# Patient Record
Sex: MALE | Race: BLACK OR AFRICAN AMERICAN | NOT HISPANIC OR LATINO | Employment: UNEMPLOYED | URBAN - METROPOLITAN AREA
[De-identification: names, ages, dates, MRNs, and addresses within clinical notes are randomized per-mention and may not be internally consistent; named-entity substitution may affect disease eponyms.]

---

## 2019-10-10 ENCOUNTER — HOSPITAL ENCOUNTER (EMERGENCY)
Facility: CLINIC | Age: 39
Discharge: HOME | End: 2019-10-10
Attending: EMERGENCY MEDICINE | Admitting: EMERGENCY MEDICINE

## 2019-10-10 VITALS
HEART RATE: 67 BPM | DIASTOLIC BLOOD PRESSURE: 76 MMHG | TEMPERATURE: 97.3 F | SYSTOLIC BLOOD PRESSURE: 128 MMHG | WEIGHT: 132.4 LBS | OXYGEN SATURATION: 100 %

## 2019-10-10 DIAGNOSIS — N34.2 URETHRITIS: ICD-10-CM

## 2019-10-10 DIAGNOSIS — Z11.3 SCREEN FOR STD (SEXUALLY TRANSMITTED DISEASE): ICD-10-CM

## 2019-10-10 LAB
ALBUMIN UR-MCNC: NEGATIVE MG/DL
APPEARANCE UR: ABNORMAL
BILIRUB UR QL STRIP: NEGATIVE
COLOR UR AUTO: ABNORMAL
GLUCOSE UR STRIP-MCNC: NEGATIVE MG/DL
HGB UR QL STRIP: ABNORMAL
KETONES UR STRIP-MCNC: NEGATIVE MG/DL
LEUKOCYTE ESTERASE UR QL STRIP: ABNORMAL
NITRATE UR QL: NEGATIVE
PH UR STRIP: 6 PH (ref 5–7)
RBC #/AREA URNS AUTO: 24 /HPF (ref 0–2)
SOURCE: ABNORMAL
SP GR UR STRIP: 1.01 (ref 1–1.03)
UROBILINOGEN UR STRIP-MCNC: NORMAL MG/DL (ref 0–2)
WBC #/AREA URNS AUTO: 66 /HPF (ref 0–5)

## 2019-10-10 PROCEDURE — 25000125 ZZHC RX 250

## 2019-10-10 PROCEDURE — 87086 URINE CULTURE/COLONY COUNT: CPT | Performed by: EMERGENCY MEDICINE

## 2019-10-10 PROCEDURE — 96372 THER/PROPH/DIAG INJ SC/IM: CPT

## 2019-10-10 PROCEDURE — 99284 EMERGENCY DEPT VISIT MOD MDM: CPT | Mod: Z6 | Performed by: EMERGENCY MEDICINE

## 2019-10-10 PROCEDURE — 81001 URINALYSIS AUTO W/SCOPE: CPT | Performed by: FAMILY MEDICINE

## 2019-10-10 PROCEDURE — 99284 EMERGENCY DEPT VISIT MOD MDM: CPT

## 2019-10-10 PROCEDURE — 25000132 ZZH RX MED GY IP 250 OP 250 PS 637: Performed by: EMERGENCY MEDICINE

## 2019-10-10 PROCEDURE — 25000128 H RX IP 250 OP 636: Performed by: EMERGENCY MEDICINE

## 2019-10-10 RX ORDER — LIDOCAINE HYDROCHLORIDE 10 MG/ML
INJECTION, SOLUTION EPIDURAL; INFILTRATION; INTRACAUDAL; PERINEURAL
Status: COMPLETED
Start: 2019-10-10 | End: 2019-10-10

## 2019-10-10 RX ORDER — CEFTRIAXONE SODIUM 250 MG
250 VIAL (EA) INJECTION ONCE
Status: COMPLETED | OUTPATIENT
Start: 2019-10-10 | End: 2019-10-10

## 2019-10-10 RX ORDER — AZITHROMYCIN 250 MG/1
1000 TABLET, FILM COATED ORAL ONCE
Status: COMPLETED | OUTPATIENT
Start: 2019-10-10 | End: 2019-10-10

## 2019-10-10 RX ADMIN — LIDOCAINE HYDROCHLORIDE 10 MG: 10 INJECTION, SOLUTION EPIDURAL; INFILTRATION; INTRACAUDAL; PERINEURAL at 14:48

## 2019-10-10 RX ADMIN — CEFTRIAXONE SODIUM 250 MG: 250 INJECTION, POWDER, FOR SOLUTION INTRAMUSCULAR; INTRAVENOUS at 14:43

## 2019-10-10 RX ADMIN — AZITHROMYCIN 1000 MG: 250 TABLET, FILM COATED ORAL at 14:42

## 2019-10-10 ASSESSMENT — ENCOUNTER SYMPTOMS
VOMITING: 0
ABDOMINAL PAIN: 0
DYSURIA: 1
FEVER: 0
HEMATURIA: 0
CHILLS: 0
NAUSEA: 0

## 2019-10-10 NOTE — DISCHARGE INSTRUCTIONS
Please make an appointment to follow up with Your Primary Care Provider, [facility] (phone: [phone], [facility] (phone: [phone]), [facility] (phone: [phone]), or [facility] in 4-6 weeks for repeat testing.    Return to the ED for worsening pain, fever, blood in your urine, abdominal pain.    Abstain from all sexual contact until cured. Inform your sexual partners that they should be tested.

## 2019-10-10 NOTE — ED PROVIDER NOTES
[facility] [place])    10/10/19       History     Chief Complaint   Patient presents with     Dysuria     penis burns every time it urinates; pain has been the last 3 days; pt had sex with new partner     The history is provided by the patient and medical records.     Juliano Vale is a 39 year old male who has a PMHx of prior gonococcal infection who presents to the Emergency Department for evaluation of burning dysuria.  Patient states that he has a burning sensation in his penis when urinating.  He states that the pain is been ongoing for the past 3 days.  States that he had unprotected intercourse with 2 female partners though he states they were not complete strangers but he was unsure of their past medical/sexual history.  He is concerned that he may have gonorrhea which he had in the past approximately 7 years ago.  He does not have a history of prior UTIs.    Here in ED, he denies fevers, back pain, hematuria, or testicular pain.  Patient did admit to some purulent penile discharge. Denies redness or irritation of the penis. Denies nausea, vomiting, diarrhea or abdominal pain.     Per review of patient's medical record, he was seen at [facility] on 9/17/2013 for dysuria.  Urine came back positive for gonorrhea and chlamydia. He was given a course of Cipro for GC and Zithromax for Chlamydia at that time.    I have reviewed the Medications, Allergies, Past Medical and Surgical History, and Social History in the Epic system.    History reviewed. No pertinent past medical history.    History reviewed. No pertinent surgical history.    History reviewed. No pertinent family history.    Social History     Tobacco Use     Smoking status: Current Some Day Smoker     Packs/day: 0.00     Smokeless tobacco: Never Used   Substance Use Topics     Alcohol use: Yes     Comment: occasional       No current facility-administered medications for this encounter.      No current outpatient  medications on file.      No Known Allergies     Review of Systems   Constitutional: Negative for chills and fever.   Gastrointestinal: Negative for abdominal pain, nausea and vomiting.   Genitourinary: Positive for discharge (white discharge) and dysuria (burning sensation). Negative for hematuria, penile pain, penile swelling, scrotal swelling and testicular pain.   All other systems reviewed and are negative.      Physical Exam   BP: 128/76  Pulse: 67  Temp: 97.3  F (36.3  C)  Weight: 60.1 kg (132 lb 6.4 oz)  SpO2: 100 %      Physical Exam  Vitals signs and nursing note reviewed. Exam conducted with a chaperone present (Male Scribe).   Constitutional:       General: He is not in acute distress.     Appearance: He is well-developed. He is ill-appearing. He is not toxic-appearing or diaphoretic.   HENT:      Head: Normocephalic and atraumatic.      Nose: Nose normal.      Mouth/Throat:      Mouth: Mucous membranes are moist.      Pharynx: Oropharynx is clear.   Eyes:      General: No scleral icterus.     Conjunctiva/sclera: Conjunctivae normal.   Neck:      Musculoskeletal: Normal range of motion and neck supple. No neck rigidity.   Cardiovascular:      Rate and Rhythm: Normal rate and regular rhythm.   Pulmonary:      Effort: Pulmonary effort is normal. No respiratory distress.      Breath sounds: Normal breath sounds. No stridor. No wheezing, rhonchi or rales.   Abdominal:      General: Bowel sounds are normal. There is no distension.      Tenderness: There is no tenderness. There is no right CVA tenderness, left CVA tenderness or rebound.      Hernia: No hernia is present. There is no hernia in the right inguinal area or left inguinal area.   Genitourinary:     Pubic Area: No rash.       Penis: Circumcised. Discharge present. No erythema, swelling or lesions.       Scrotum/Testes: Normal.         Right: Mass, tenderness or swelling not present.         Left: Mass, tenderness or swelling not present.       Comments: White purulent discharge from urethra. No surrounding skin changes or cellulitic abnormailities of glans penis. Hard, nontender inguinal limph nodes palpated bilaterally.   Musculoskeletal: Normal range of motion.         General: No deformity.   Lymphadenopathy:      Lower Body: Right inguinal adenopathy present. Left inguinal adenopathy present.   Skin:     General: Skin is warm and dry.      Findings: No rash.   Neurological:      Mental Status: He is alert and oriented to person, place, and time.   Psychiatric:         Behavior: Behavior normal.         Thought Content: Thought content normal.         ED Course   2:33 PM  The patient was seen and examined by Judith Reeves MD in Room ED03.       Procedures             Critical Care time:  none             Labs Ordered and Resulted from Time of ED Arrival Up to the Time of Departure from the ED   ROUTINE UA WITH MICROSCOPIC REFLEX TO CULTURE - Abnormal; Notable for the following components:       Result Value    Blood Urine Small (*)     Leukocyte Esterase Urine Large (*)     WBC Urine 66 (*)     RBC Urine 24 (*)     All other components within normal limits            Assessments & Plan (with Medical Decision Making)   This is a 39 year old male who has a PMHx of prior gonococcal infection who presents to the Emergency Department for evaluation of burning dysuria.      Ddx: STD, urethritis, UTI    I have reviewed the nursing notes.    I have reviewed the findings, diagnosis, plan and need for follow up with the patient. Treated empirically for STD (urethritis) with IM ceftriaxone and PO azithromycin. Discussed safe sex practices and abstinence until cure. Patient declined HIV or syphilis testing. Advised f/u with PCP for repeat testing in 3-4 weeks and encouraged him to inform partners of sxs so they can seek testing as well.    There are no discharge medications for this patient.      Final diagnoses:   Urethritis   Screen for STD (sexually transmitted  disease)     I, Jaskaran Alicia, am serving as a trained medical scribe to document services personally performed by Judith Reeves MD, based on the provider's statements to me.   I, Judith Reeves MD, was physically present and have reviewed and verified the accuracy of this note documented by Jaskaran Alicia.     10/10/2019   [facility], [place], EMERGENCY DEPARTMENT     Judith Reeves MD  10/14/19 4363

## 2019-10-10 NOTE — ED AVS SNAPSHOT
[facility], [place], Emergency Department  [address]  [facility] [address]  Phone:  [phone]  Fax:  [phone]                                    Juliano Vale   MRN: [id number]    Department:  [facility], Emergency Department   Date of Visit:  10/10/2019           After Visit Summary Signature Page    I have received my discharge instructions, and my questions have been answered. I have discussed any challenges I see with this plan with the nurse or doctor.    ..........................................................................................................................................  Patient/Patient Representative Signature      ..........................................................................................................................................  Patient Representative Print Name and Relationship to Patient    ..................................................               ................................................  Date                                   Time    ..........................................................................................................................................  Reviewed by Signature/Title    ...................................................              ..............................................  Date                                               Time          22EPIC Rev 08/18

## 2019-10-11 LAB
BACTERIA SPEC CULT: NO GROWTH
Lab: NORMAL
SPECIMEN SOURCE: NORMAL

## 2020-06-01 ENCOUNTER — APPOINTMENT (OUTPATIENT)
Dept: ULTRASOUND IMAGING | Facility: CLINIC | Age: 40
End: 2020-06-01
Attending: EMERGENCY MEDICINE
Payer: COMMERCIAL

## 2020-06-01 ENCOUNTER — HOSPITAL ENCOUNTER (EMERGENCY)
Facility: CLINIC | Age: 40
Discharge: HOME | End: 2020-06-01
Attending: EMERGENCY MEDICINE | Admitting: EMERGENCY MEDICINE
Payer: COMMERCIAL

## 2020-06-01 VITALS
RESPIRATION RATE: 16 BRPM | DIASTOLIC BLOOD PRESSURE: 98 MMHG | SYSTOLIC BLOOD PRESSURE: 141 MMHG | TEMPERATURE: 98 F | OXYGEN SATURATION: 100 % | HEART RATE: 52 BPM

## 2020-06-01 DIAGNOSIS — R10.13 ABDOMINAL PAIN, EPIGASTRIC: ICD-10-CM

## 2020-06-01 LAB
ALBUMIN SERPL-MCNC: 4.1 G/DL (ref 3.4–5)
ALP SERPL-CCNC: 67 U/L (ref 40–150)
ALT SERPL W P-5'-P-CCNC: 24 U/L (ref 0–70)
ANION GAP SERPL CALCULATED.3IONS-SCNC: 4 MMOL/L (ref 3–14)
AST SERPL W P-5'-P-CCNC: 19 U/L (ref 0–45)
BASOPHILS # BLD AUTO: 0 10E9/L (ref 0–0.2)
BASOPHILS NFR BLD AUTO: 0.4 %
BILIRUB SERPL-MCNC: 0.4 MG/DL (ref 0.2–1.3)
BUN SERPL-MCNC: 11 MG/DL (ref 7–30)
CALCIUM SERPL-MCNC: 9.8 MG/DL (ref 8.5–10.1)
CHLORIDE SERPL-SCNC: 105 MMOL/L (ref 94–109)
CO2 SERPL-SCNC: 28 MMOL/L (ref 20–32)
CREAT SERPL-MCNC: 0.97 MG/DL (ref 0.66–1.25)
DIFFERENTIAL METHOD BLD: NORMAL
EOSINOPHIL # BLD AUTO: 0 10E9/L (ref 0–0.7)
EOSINOPHIL NFR BLD AUTO: 0.6 %
ERYTHROCYTE [DISTWIDTH] IN BLOOD BY AUTOMATED COUNT: 13.1 % (ref 10–15)
ETHANOL SERPL-MCNC: <0.01 G/DL
GFR SERPL CREATININE-BSD FRML MDRD: >90 ML/MIN/{1.73_M2}
GLUCOSE SERPL-MCNC: 84 MG/DL (ref 70–99)
HCT VFR BLD AUTO: 46.3 % (ref 40–53)
HGB BLD-MCNC: 15.2 G/DL (ref 13.3–17.7)
IMM GRANULOCYTES # BLD: 0 10E9/L (ref 0–0.4)
IMM GRANULOCYTES NFR BLD: 0.2 %
LIPASE SERPL-CCNC: 60 U/L (ref 73–393)
LYMPHOCYTES # BLD AUTO: 2.2 10E9/L (ref 0.8–5.3)
LYMPHOCYTES NFR BLD AUTO: 40.2 %
MCH RBC QN AUTO: 30 PG (ref 26.5–33)
MCHC RBC AUTO-ENTMCNC: 32.8 G/DL (ref 31.5–36.5)
MCV RBC AUTO: 91 FL (ref 78–100)
MONOCYTES # BLD AUTO: 0.5 10E9/L (ref 0–1.3)
MONOCYTES NFR BLD AUTO: 9.2 %
NEUTROPHILS # BLD AUTO: 2.7 10E9/L (ref 1.6–8.3)
NEUTROPHILS NFR BLD AUTO: 49.4 %
NRBC # BLD AUTO: 0 10*3/UL
NRBC BLD AUTO-RTO: 0 /100
PLATELET # BLD AUTO: 193 10E9/L (ref 150–450)
POTASSIUM SERPL-SCNC: 4.6 MMOL/L (ref 3.4–5.3)
PROT SERPL-MCNC: 7.8 G/DL (ref 6.8–8.8)
RBC # BLD AUTO: 5.07 10E12/L (ref 4.4–5.9)
SODIUM SERPL-SCNC: 137 MMOL/L (ref 133–144)
WBC # BLD AUTO: 5.5 10E9/L (ref 4–11)

## 2020-06-01 PROCEDURE — 85025 COMPLETE CBC W/AUTO DIFF WBC: CPT | Performed by: EMERGENCY MEDICINE

## 2020-06-01 PROCEDURE — 99284 EMERGENCY DEPT VISIT MOD MDM: CPT | Mod: Z6 | Performed by: EMERGENCY MEDICINE

## 2020-06-01 PROCEDURE — 25000125 ZZHC RX 250: Performed by: EMERGENCY MEDICINE

## 2020-06-01 PROCEDURE — 83690 ASSAY OF LIPASE: CPT | Performed by: EMERGENCY MEDICINE

## 2020-06-01 PROCEDURE — 25000132 ZZH RX MED GY IP 250 OP 250 PS 637: Performed by: EMERGENCY MEDICINE

## 2020-06-01 PROCEDURE — 80053 COMPREHEN METABOLIC PANEL: CPT | Performed by: EMERGENCY MEDICINE

## 2020-06-01 PROCEDURE — 76705 ECHO EXAM OF ABDOMEN: CPT

## 2020-06-01 PROCEDURE — 80320 DRUG SCREEN QUANTALCOHOLS: CPT | Performed by: EMERGENCY MEDICINE

## 2020-06-01 PROCEDURE — 99284 EMERGENCY DEPT VISIT MOD MDM: CPT | Mod: 25 | Performed by: EMERGENCY MEDICINE

## 2020-06-01 PROCEDURE — 25000128 H RX IP 250 OP 636: Performed by: EMERGENCY MEDICINE

## 2020-06-01 RX ORDER — ONDANSETRON 4 MG/1
4 TABLET, FILM COATED ORAL EVERY 8 HOURS PRN
Qty: 15 TABLET | Refills: 0 | Status: SHIPPED | OUTPATIENT
Start: 2020-06-01 | End: 2021-08-21

## 2020-06-01 RX ORDER — ONDANSETRON 4 MG/1
4 TABLET, ORALLY DISINTEGRATING ORAL ONCE
Status: COMPLETED | OUTPATIENT
Start: 2020-06-01 | End: 2020-06-01

## 2020-06-01 RX ORDER — SUCRALFATE 1 G/1
1 TABLET ORAL 2 TIMES DAILY PRN
Qty: 15 TABLET | Refills: 0 | Status: SHIPPED | OUTPATIENT
Start: 2020-06-01 | End: 2021-08-21

## 2020-06-01 RX ORDER — PANTOPRAZOLE SODIUM 40 MG/1
40 TABLET, DELAYED RELEASE ORAL DAILY
Qty: 30 TABLET | Refills: 0 | Status: SHIPPED | OUTPATIENT
Start: 2020-06-01 | End: 2020-07-01

## 2020-06-01 RX ADMIN — LIDOCAINE HYDROCHLORIDE 30 ML: 20 SOLUTION ORAL; TOPICAL at 17:22

## 2020-06-01 RX ADMIN — ONDANSETRON 4 MG: 4 TABLET, ORALLY DISINTEGRATING ORAL at 17:21

## 2020-06-01 NOTE — DISCHARGE INSTRUCTIONS
TODAY'S VISIT:  You were seen today for abdominal pain   -   - If you had any labs or imaging/radiology tests performed today, you should also discuss these tests with your usual provider.     FOLLOW-UP:  Please make an appointment to follow up with:  - Your Primary Care Provider. If you do not have a PCP, please call the [facility] (phone: [phone] for an appointment  -   Please make an appointment to follow up with GI Clinic (phone: [phone]) if your symptoms do not resolve    - Have your provider review the results from today's visit with you again to make sure no further follow-up or additional testing is needed based on those results.     Medications  - protonix  - zofran  - carafate    Other instructions  - try to eat a soft bland diet, avoiding fatty foods, spicy foods, alcohol, or acidic foods such as citrus fruits or tomatoes    RETURN TO THE EMERGENCY DEPARTMENT  Return to the Emergency Department at any time for any new or worsening symptoms or any concerns.

## 2020-06-01 NOTE — ED PROVIDER NOTES
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  The patient states is located in the mid abdomen/epigastric region.  It has been present for several days.  It worsens with eating/drinking.  It started after he had an episode of heavy drinking in which he mixed the alcohol with cranberry juice.  He was attributing the pain to the cranberry juice, because he reports he drank a lot of it, but it has not been improving since he stopped his juice consumption.  The patient states that he typically drinks 1 to 2 glasses of alcohol 2-3 times per week.  He does not drink every day.  He states that he did not have atypical amount of alcohol for him.  He states that he did vomit the other day, nonbloody emesis, however, today he was able to tolerate food, although he did again experience nausea that worsened with eating or drinking.  The patient states that he does smoke cigarettes and uses marijuana several times a week.    I have reviewed the Medications, Allergies, Past Medical and Surgical History, and Social History in the Epic system.    No past medical history on file.  No past surgical history on file.  No current facility-administered medications for this encounter.      No current outpatient medications on file.     No Known Allergies  Past medical history, past surgical history, medications, and allergies were reviewed with the patient. Additional pertinent items: None    Social History     Socioeconomic History     Marital status: Single     Spouse name: Not on file     Number of children: Not on file     Years of education: Not on file     Highest education level: Not on file   Occupational History     Not on file   Social Needs     Financial resource strain: Not on file     Food  insecurity     Worry: Not on file     Inability: Not on file     Transportation needs     Medical: Not on file     Non-medical: Not on file   Tobacco Use     Smoking status: Current Some Day Smoker     Packs/day: 0.00     Smokeless tobacco: Never Used   Substance and Sexual Activity     Alcohol use: Yes     Comment: occasional     Drug use: Not on file     Sexual activity: Not on file   Lifestyle     Physical activity     Days per week: Not on file     Minutes per session: Not on file     Stress: Not on file   Relationships     Social connections     Talks on phone: Not on file     Gets together: Not on file     Attends Denominational service: Not on file     Active member of club or organization: Not on file     Attends meetings of clubs or organizations: Not on file     Relationship status: Not on file     Intimate partner violence     Fear of current or ex partner: Not on file     Emotionally abused: Not on file     Physically abused: Not on file     Forced sexual activity: Not on file   Other Topics Concern     Not on file   Social History Narrative     Not on file     Social history was reviewed with the patient. Additional pertinent items: None    Review of Systems  General: No fevers or chills  Skin: No rash or diaphoresis  Eyes: No eye redness or discharge  Ears/Nose/Throat: No rhinorrhea or nasal congestion  Respiratory: No cough or SOB  Cardiovascular: No chest pain or palpitations  Gastrointestinal: See HPI  Genitourinary: No urinary frequency, hematuria, or dysuria  Musculoskeletal: No arthralgias or myalgias  Neurologic: No numbness or weakness  Psychiatric: Positive for alcohol use  Hematologic/Lymphatic/Immunologic: No leg swelling, no easy bruising/bleeding  Endocrine: No polyuria/polydypsia    A complete review of systems was performed with pertinent positives and negatives noted in the HPI, and all other systems negative.    Physical Exam   BP: (!) 142/94  Pulse: 101  Temp: 98  F (36.7  C)  SpO2: 98  %      General: Well nourished, well developed, NAD  HEENT: EOMI, anicteric. NCAT, MMM  Neck: no jugular venous distension, supple, nl ROM  Cardiac: Regular rate and rhythm.  Intact peripheral pulses  Pulm: Airway patent, nonlabored breathing  Abd: Soft, mild tenderness to palpation in the epigastric region without rebound or guarding, no Hilario sign, nondistended.  No masses palpated.    Skin: Warm and dry to the touch.  No rash  Extremities: No LE edema, no cyanosis, w/w/p  Neuro: A&Ox3, no gross focal deficits    ED Course        Procedures                           Labs Ordered and Resulted from Time of ED Arrival Up to the Time of Departure from the ED   LIPASE - Abnormal; Notable for the following components:       Result Value    Lipase 60 (*)     All other components within normal limits   CBC WITH PLATELETS DIFFERENTIAL   COMPREHENSIVE METABOLIC PANEL   ALCOHOL ETHYL   UA MACROSCOPIC WITH REFLEX TO MICRO AND CULTURE            Results for orders placed or performed during the hospital encounter of 06/01/20 (from the past 24 hour(s))   CBC with platelets differential   Result Value Ref Range    WBC 5.5 4.0 - 11.0 10e9/L    RBC Count 5.07 4.4 - 5.9 10e12/L    Hemoglobin 15.2 13.3 - 17.7 g/dL    Hematocrit 46.3 40.0 - 53.0 %    MCV 91 78 - 100 fl    MCH 30.0 26.5 - 33.0 pg    MCHC 32.8 31.5 - 36.5 g/dL    RDW 13.1 10.0 - 15.0 %    Platelet Count 193 150 - 450 10e9/L    Diff Method Automated Method     % Neutrophils 49.4 %    % Lymphocytes 40.2 %    % Monocytes 9.2 %    % Eosinophils 0.6 %    % Basophils 0.4 %    % Immature Granulocytes 0.2 %    Nucleated RBCs 0 0 /100    Absolute Neutrophil 2.7 1.6 - 8.3 10e9/L    Absolute Lymphocytes 2.2 0.8 - 5.3 10e9/L    Absolute Monocytes 0.5 0.0 - 1.3 10e9/L    Absolute Eosinophils 0.0 0.0 - 0.7 10e9/L    Absolute Basophils 0.0 0.0 - 0.2 10e9/L    Abs Immature Granulocytes 0.0 0 - 0.4 10e9/L    Absolute Nucleated RBC 0.0    Comprehensive metabolic panel   Result Value  Ref Range    Sodium 137 133 - 144 mmol/L    Potassium 4.6 3.4 - 5.3 mmol/L    Chloride 105 94 - 109 mmol/L    Carbon Dioxide 28 20 - 32 mmol/L    Anion Gap 4 3 - 14 mmol/L    Glucose 84 70 - 99 mg/dL    Urea Nitrogen 11 7 - 30 mg/dL    Creatinine 0.97 0.66 - 1.25 mg/dL    GFR Estimate >90 >60 mL/min/[1.73_m2]    GFR Estimate If Black >90 >60 mL/min/[1.73_m2]    Calcium 9.8 8.5 - 10.1 mg/dL    Bilirubin Total 0.4 0.2 - 1.3 mg/dL    Albumin 4.1 3.4 - 5.0 g/dL    Protein Total 7.8 6.8 - 8.8 g/dL    Alkaline Phosphatase 67 40 - 150 U/L    ALT 24 0 - 70 U/L    AST 19 0 - 45 U/L   Lipase   Result Value Ref Range    Lipase 60 (L) 73 - 393 U/L   Alcohol ethyl   Result Value Ref Range    Ethanol g/dL <0.01 <0.01 g/dL   Abdomen US, limited (RUQ only)    Narrative    US ABDOMEN LIMITED 6/1/2020 5:51 PM    CLINICAL HISTORY: Epigastric and right upper quadrant pain. Nausea and  vomiting.  TECHNIQUE: Limited abdominal ultrasound.    COMPARISON: None.    FINDINGS:    GALLBLADDER: The gallbladder is normal. No gallstones, wall  thickening, or pericholecystic fluid. Negative sonographic Hilario's  sign.    BILE DUCTS: There is no biliary dilatation. The common duct measures 3  mm.    LIVER: Normal where seen.    RIGHT KIDNEY: Unremarkable. No hydronephrosis.    PANCREAS: The visualized portions of the pancreas are normal.    No ascites.      Impression    IMPRESSION: Unremarkable right upper quadrant ultrasound.     FORTINO GARCIA MD       Labs, vital signs, and imaging studies were reviewed by me.    Medications   lidocaine (XYLOCAINE) 2 % 15 mL, alum & mag hydroxide-simethicone (MAALOX  ES) 15 mL GI Cocktail (30 mLs Oral Given 6/1/20 1722)   ondansetron (ZOFRAN-ODT) ODT tab 4 mg (4 mg Oral Given 6/1/20 1721)       Assessments & Plan (with Medical Decision Making)   Juliano Vale is a 39 year old male who presents with abdominal pain.  Differential diagnosis includes GERD, gastritis, pancreatitis, hepatitis, cholelithiasis,  cholecystitis, peptic ulcer disease, musculoskeletal abdominal wall pain.  Labs and urinalysis ordered to further evaluate the patient.  GI cocktail and Zofran given to treat patient's symptoms in the emergency department.  Vital signs are within normal limits on arrival to the emergency department with the exception of heart rate which is 101 bpm.    Laboratory work-up is unremarkable.    Ultrasound shows no acute disease process.    I have reviewed the nursing notes.    I have reviewed the findings, diagnosis, plan and need for follow up with the patient.    Patient to be discharged home. Advised to follow up with PCP within 1 week.  Patient was also provided with GI referral for follow-up for further evaluation if his symptoms do not improve.  Discussed possibility of peptic ulcer disease versus gastritis and difficulty of distinguishing between the 2 without endoscopy with the patient.  To return to ER immediately with any new/worsening symptoms. Plan of care discussed with patient who expresses understanding and agrees with plan of care.  Patient was provided with prescriptions for Protonix, Carafate, and Zofran for symptomatic relief at home.  Patient advised to eat a soft bland diet and avoid things that may exacerbate his symptoms such as alcohol, fatty foods, spicy foods, or acidic foods.    I did discuss with the patient that if his symptoms fail to improve with medications and a bland diet, he may need GI follow-up for an upper endoscopy to further evaluate him for gastritis/peptic ulcer disease/other etiology for his symptoms.  I also discussed with him that if further GI evaluation does not reveal an etiology for his symptoms, it is possible that his frequent marijuana usage may be contributing to his nausea and vomiting and, at that time, he may want to consider cessation of marijuana usage for a period of time to see if this improves his symptoms.       New Prescriptions    ONDANSETRON (ZOFRAN) 4 MG  TABLET    Take 1 tablet (4 mg) by mouth every 8 hours as needed    PANTOPRAZOLE (PROTONIX) 40 MG EC TABLET    Take 1 tablet (40 mg) by mouth daily for 30 doses    SUCRALFATE (CARAFATE) 1 GM TABLET    Take 1 tablet (1 g) by mouth 2 times daily as needed (abdominal pain)       Final diagnoses:   Abdominal pain, epigastric       6/1/2020   [facility], [place], EMERGENCY DEPARTMENT     Carolina Darden MD  06/01/20 1824       Carolina Darden MD  06/01/20 0894

## 2020-06-01 NOTE — ED TRIAGE NOTES
Pt states haviing mid abd pain the past few days since drinking heavily and was mixing the alcohol with cranberry juice.  Pt thinks the pain is from the juice but its not getting any better.

## 2020-06-01 NOTE — ED AVS SNAPSHOT
[facility], [place], Emergency Department  [address]  [facility] [address]  Phone:  [phone]  Fax:  [phone]                                    Juliano Vale   MRN: [id number]    Department:  [facility], Emergency Department   Date of Visit:  6/1/2020           After Visit Summary Signature Page    I have received my discharge instructions, and my questions have been answered. I have discussed any challenges I see with this plan with the nurse or doctor.    ..........................................................................................................................................  Patient/Patient Representative Signature      ..........................................................................................................................................  Patient Representative Print Name and Relationship to Patient    ..................................................               ................................................  Date                                   Time    ..........................................................................................................................................  Reviewed by Signature/Title    ...................................................              ..............................................  Date                                               Time          22EPIC Rev 08/18

## 2020-06-21 ENCOUNTER — HOSPITAL ENCOUNTER (EMERGENCY)
Facility: CLINIC | Age: 40
Discharge: HOME | End: 2020-06-21
Attending: EMERGENCY MEDICINE | Admitting: EMERGENCY MEDICINE
Payer: COMMERCIAL

## 2020-06-21 VITALS
RESPIRATION RATE: 16 BRPM | SYSTOLIC BLOOD PRESSURE: 130 MMHG | WEIGHT: 133 LBS | HEART RATE: 60 BPM | TEMPERATURE: 98 F | DIASTOLIC BLOOD PRESSURE: 90 MMHG | OXYGEN SATURATION: 99 %

## 2020-06-21 DIAGNOSIS — K29.70 GASTRITIS WITHOUT BLEEDING, UNSPECIFIED CHRONICITY, UNSPECIFIED GASTRITIS TYPE: ICD-10-CM

## 2020-06-21 PROCEDURE — 25000128 H RX IP 250 OP 636: Performed by: EMERGENCY MEDICINE

## 2020-06-21 PROCEDURE — 99284 EMERGENCY DEPT VISIT MOD MDM: CPT | Mod: Z6 | Performed by: EMERGENCY MEDICINE

## 2020-06-21 PROCEDURE — 25000125 ZZHC RX 250: Performed by: EMERGENCY MEDICINE

## 2020-06-21 PROCEDURE — 25000132 ZZH RX MED GY IP 250 OP 250 PS 637: Performed by: EMERGENCY MEDICINE

## 2020-06-21 PROCEDURE — 99283 EMERGENCY DEPT VISIT LOW MDM: CPT | Performed by: EMERGENCY MEDICINE

## 2020-06-21 RX ORDER — ONDANSETRON 4 MG/1
4 TABLET, ORALLY DISINTEGRATING ORAL ONCE
Status: COMPLETED | OUTPATIENT
Start: 2020-06-21 | End: 2020-06-21

## 2020-06-21 RX ORDER — FAMOTIDINE 20 MG/1
20 TABLET, FILM COATED ORAL 2 TIMES DAILY
Qty: 30 TABLET | Refills: 0 | Status: SHIPPED | OUTPATIENT
Start: 2020-06-21 | End: 2021-08-21

## 2020-06-21 RX ORDER — METOCLOPRAMIDE 10 MG/1
10 TABLET ORAL
Qty: 30 TABLET | Refills: 0 | Status: SHIPPED | OUTPATIENT
Start: 2020-06-21 | End: 2021-08-21

## 2020-06-21 RX ADMIN — LIDOCAINE HYDROCHLORIDE 30 ML: 20 SOLUTION ORAL; TOPICAL at 18:36

## 2020-06-21 RX ADMIN — ONDANSETRON 4 MG: 4 TABLET, ORALLY DISINTEGRATING ORAL at 18:36

## 2020-06-21 ASSESSMENT — ENCOUNTER SYMPTOMS
NAUSEA: 1
ABDOMINAL PAIN: 1
VOMITING: 0
FEVER: 0

## 2020-06-21 NOTE — DISCHARGE INSTRUCTIONS
Please make an appointment to follow up with Primary Care - [facility] (phone: [phone]) and GI Clinic (phone: [phone]) in 3-5 days even if entirely better.    Take medications as prescribed.     Do not use marijuana or alcohol which can both worsen your problem.

## 2020-06-21 NOTE — ED AVS SNAPSHOT
[facility], [place], Emergency Department  [address]  [facility] [address]  Phone:  [phone]  Fax:  [phone]                                    Juliano Vale   MRN: [id number]    Department:  [facility], Emergency Department   Date of Visit:  6/21/2020           After Visit Summary Signature Page    I have received my discharge instructions, and my questions have been answered. I have discussed any challenges I see with this plan with the nurse or doctor.    ..........................................................................................................................................  Patient/Patient Representative Signature      ..........................................................................................................................................  Patient Representative Print Name and Relationship to Patient    ..................................................               ................................................  Date                                   Time    ..........................................................................................................................................  Reviewed by Signature/Title    ...................................................              ..............................................  Date                                               Time          22EPIC Rev 08/18

## 2020-06-21 NOTE — ED PROVIDER NOTES
"    [facility] EMERGENCY DEPARTMENT ([facility])     June 21, 2020  History     Chief Complaint   Patient presents with     Abdominal Pain     pt here for follow after being seen for same 6/1 here. pt has ongoing pain, and nausea. pt felt worse on meds.     The history is provided by medical records and the patient.     Juliano Vale is a 39 year old male with a medical history significant for deafness of the left ear who presents to the ED today complaining of abdominal pain.  Patient reports his stomach pain and nausea has been going on for 2 weeks now.  Patient reports he only has this nausea and pain problem in the morning when eating.  States he will only take a few bites and will throw the rest of his breakfast away.  Patient reports the pain is coming from his stomach area.  Patient reports the pain is exacerbated with eating and laying down.  Patient reports drinking alcohol 5 days ago, and vomiting 4 days ago.  Patient reports he smokes marijuana daily and last smoked yesterday and today.  Patient denies any recent vomiting, bloody emesis, or fever.      Patient was seen here in the ED 20 days ago on 6/1/2020 for abdominal pain, with this note reading: \"Juliano Vale is a 39 year old male with a past medical history of seasonal allergies and deafness in the left ear who presents to the emergency department with a chief complaint of abdominal pain.  The patient states is located in the mid abdomen/epigastric region.  It has been present for several days.  It worsens with eating/drinking.  It started after he had an episode of heavy drinking in which he mixed the alcohol with cranberry juice.  He was attributing the pain to the cranberry juice, because he reports he drank a lot of it, but it has not been improving since he stopped his juice consumption.  The patient states that he typically drinks 1 to 2 glasses of alcohol 2-3 times per week.  He does not drink every day.  He states that he did not " "have atypical amount of alcohol for him.  He states that he did vomit the other day, nonbloody emesis, however, today he was able to tolerate food, although he did again experience nausea that worsened with eating or drinking.  The patient states that he does smoke cigarettes and uses marijuana several times a week.\"    I have reviewed the Medications, Allergies, Past Medical and Surgical History, and Social History in the Epic system.  PAST MEDICAL HISTORY: History reviewed. No pertinent past medical history.    PAST SURGICAL HISTORY: History reviewed. No pertinent surgical history.    Past medical history, past surgical history, medications, and allergies were reviewed with the patient. Additional pertinent items: None    FAMILY HISTORY: No family history on file.    SOCIAL HISTORY:   Social History     Tobacco Use     Smoking status: Current Some Day Smoker     Packs/day: 0.00     Smokeless tobacco: Never Used   Substance Use Topics     Alcohol use: Yes     Comment: occasional     Social history was reviewed with the patient. Additional pertinent items: None      Patient's Medications   New Prescriptions    No medications on file   Previous Medications    ONDANSETRON (ZOFRAN) 4 MG TABLET    Take 1 tablet (4 mg) by mouth every 8 hours as needed    PANTOPRAZOLE (PROTONIX) 40 MG EC TABLET    Take 1 tablet (40 mg) by mouth daily for 30 doses    SUCRALFATE (CARAFATE) 1 GM TABLET    Take 1 tablet (1 g) by mouth 2 times daily as needed (abdominal pain)   Modified Medications    No medications on file   Discontinued Medications    No medications on file        No Known Allergies     Review of Systems   Constitutional: Negative for fever.   Gastrointestinal: Positive for abdominal pain (Stomach) and nausea (Morning only). Negative for vomiting.   All other systems reviewed and are negative.    A complete review of systems was performed with pertinent positives and negatives noted in the HPI, and all other systems " negative.    Physical Exam   BP: (!) 133/97  Pulse: 99  Temp: 98.3  F (36.8  C)  Resp: 16  Weight: 60.3 kg (133 lb)  SpO2: 98 %      Physical Exam  Constitutional:       Appearance: He is well-developed.   HENT:      Head: Normocephalic and atraumatic.   Neck:      Musculoskeletal: Normal range of motion and neck supple.   Cardiovascular:      Rate and Rhythm: Normal rate and regular rhythm.      Heart sounds: Normal heart sounds.   Pulmonary:      Effort: Pulmonary effort is normal. No respiratory distress.      Breath sounds: No wheezing.   Abdominal:      General: There is no distension.      Palpations: Abdomen is soft. There is no shifting dullness, hepatomegaly or mass.      Tenderness: There is no abdominal tenderness. There is right CVA tenderness. There is no rebound.   Skin:     General: Skin is warm.   Neurological:      General: No focal deficit present.      Mental Status: He is alert and oriented to person, place, and time.   Psychiatric:         Behavior: Behavior normal.         Thought Content: Thought content normal.         ED Course   6:10 PM  The patient was seen and examined by Janessa Castorena MD in Room ED04.        Procedures                              No results found for this or any previous visit (from the past 24 hour(s)).  Medications - No data to display          Assessments & Plan (with Medical Decision Making)   Is a very nice 39-year-old male who presents the ER complaining of abdominal cramping and upset stomach that happens in the morning.  She is frustrated that she he has been taking the Protonix and Carafate for 2 weeks and feels again symptoms have not completely resolved.  Symptoms have improved and he is no longer having vomiting and has no pain except for in the morning.  A long discussion with the patient that it takes time for gastritis symptoms to resolve.  Patient also needs to stop drinking alcohol and using marijuana which could both be exacerbating his symptoms.   Patient has no tenderness on exam and had normal labs in the ultrasound 2 weeks prior.  I therefore do not think that these need to be repeated today.  Patient also has had no bleeding or fevers to suggest peptic ulcer disease or any other acute infectious process.  Recommend the patient take Reglan for nausea and Pepcid to treat his gastritis symptoms.  Patient encouraged to eat small frequent meals and to follow-up with GI for symptoms continue.  Patient is able to eat and drink here in the ER.  Patient will be discharged home with GI follow-up      I have reviewed the nursing notes.    I have reviewed the findings, diagnosis, plan and need for follow up with the patient.    New Prescriptions    No medications on file       Final diagnoses:   Gastritis without bleeding, unspecified chronicity, unspecified gastritis type   I, Tamir Hopkins, am serving as a trained medical scribe to document services personally performed by Janessa Castorena MD, based on the provider's statements to me.     I, Janessa Castorena MD, was physically present and have reviewed and verified the accuracy of this note documented by Tamir Hopkins.      6/21/2020   [facility], [place], EMERGENCY DEPARTMENT     Janessa Castorena MD  06/21/20 5822

## 2020-06-23 ENCOUNTER — HOSPITAL ENCOUNTER (EMERGENCY)
Facility: CLINIC | Age: 40
Discharge: HOME | End: 2020-06-23
Attending: FAMILY MEDICINE | Admitting: FAMILY MEDICINE
Payer: COMMERCIAL

## 2020-06-23 VITALS
OXYGEN SATURATION: 100 % | HEART RATE: 59 BPM | RESPIRATION RATE: 18 BRPM | TEMPERATURE: 97.2 F | SYSTOLIC BLOOD PRESSURE: 140 MMHG | DIASTOLIC BLOOD PRESSURE: 106 MMHG | WEIGHT: 134.4 LBS

## 2020-06-23 DIAGNOSIS — H61.21 IMPACTED CERUMEN OF RIGHT EAR: ICD-10-CM

## 2020-06-23 DIAGNOSIS — H91.93 BILATERAL HEARING LOSS, UNSPECIFIED HEARING LOSS TYPE: ICD-10-CM

## 2020-06-23 PROCEDURE — 99282 EMERGENCY DEPT VISIT SF MDM: CPT | Mod: 25

## 2020-06-23 PROCEDURE — 69209 REMOVE IMPACTED EAR WAX UNI: CPT

## 2020-06-23 PROCEDURE — 99282 EMERGENCY DEPT VISIT SF MDM: CPT | Mod: Z6 | Performed by: FAMILY MEDICINE

## 2020-06-23 ASSESSMENT — ENCOUNTER SYMPTOMS
ABDOMINAL PAIN: 0
FEVER: 0
DECREASED CONCENTRATION: 0
TROUBLE SWALLOWING: 0
DYSPHORIC MOOD: 0
SHORTNESS OF BREATH: 0
SORE THROAT: 0
SINUS PRESSURE: 0
SINUS PAIN: 0

## 2020-06-23 NOTE — ED PROVIDER NOTES
[facility] EMERGENCY DEPARTMENT ([facility])     June 23, 2020  History     Chief Complaint   Patient presents with     Otalgia     States his right ear is plugged with wax.  Left OK     HPI  Juliano Vale is a 39 year old male with a medical history significant for deafness of the left ear who presents to the ED today complaining of decrease hearing in right hear. Patient feels wax build up and denies any pain or drainage. Wears hearing aid in right ear. Has appt with audiologist in early July for follow up. No other complaints. No fever or sinus sx. No other trauma. No rash.    I have reviewed the Medications, Allergies, Past Medical and Surgical History, and Social History in the [employer] system.  PAST MEDICAL HISTORY: History reviewed. No pertinent past medical history.    PAST SURGICAL HISTORY: History reviewed. No pertinent surgical history.    Past medical history, past surgical history, medications, and allergies were reviewed with the patient. Additional pertinent items: None    FAMILY HISTORY: History reviewed. No pertinent family history.    SOCIAL HISTORY:   Social History     Tobacco Use     Smoking status: Current Some Day Smoker     Packs/day: 0.00     Smokeless tobacco: Never Used   Substance Use Topics     Alcohol use: Yes     Comment: occasional     Social history was reviewed with the patient. Additional pertinent items: None      Discharge Medication List as of 6/23/2020  3:52 PM      CONTINUE these medications which have NOT CHANGED    Details   famotidine (PEPCID) 20 MG tablet Take 1 tablet (20 mg) by mouth 2 times daily, Disp-30 tablet,R-0, Local Print      metoclopramide (REGLAN) 10 MG tablet Take 1 tablet (10 mg) by mouth 4 times daily (before meals and nightly), Disp-30 tablet,R-0, Local Print      ondansetron (ZOFRAN) 4 MG tablet Take 1 tablet (4 mg) by mouth every 8 hours as needed, Disp-15 tablet,R-0, Local Print      pantoprazole (PROTONIX) 40 MG EC tablet Take 1 tablet (40 mg)  by mouth daily for 30 doses, Disp-30 tablet,R-0, Local Print      sucralfate (CARAFATE) 1 GM tablet Take 1 tablet (1 g) by mouth 2 times daily as needed (abdominal pain), Disp-15 tablet,R-0, Local Print              No Known Allergies     Review of Systems   Constitutional: Negative for fever.   HENT: Positive for hearing loss (decrease in right ear. ). Negative for sinus pressure, sinus pain, sore throat and trouble swallowing.    Eyes: Negative for visual disturbance.   Respiratory: Negative for shortness of breath.    Cardiovascular: Negative for chest pain.   Gastrointestinal: Negative for abdominal pain.   Skin: Negative for rash.   Psychiatric/Behavioral: Negative for decreased concentration and dysphoric mood.   All other systems reviewed and are negative.    A complete review of systems was performed with pertinent positives and negatives noted in the HPI, and all other systems negative.    Physical Exam   BP: (!) 139/100  Pulse: 108  Temp: 97.2  F (36.2  C)  Resp: 16  Weight: 61 kg (134 lb 6.4 oz)  SpO2: 97 %      Physical Exam  Vitals signs and nursing note reviewed.   Constitutional:       General: He is not in acute distress.     Appearance: He is well-developed. He is not diaphoretic.      Comments: Patient heard of hearing only hear right ear with aid.   HENT:      Head: Normocephalic and atraumatic.      Ears:      Comments: Cerumen in right canal and left. No infection or drainage noted.     Nose: Nose normal.      Mouth/Throat:      Mouth: Mucous membranes are moist.   Eyes:      General: No scleral icterus.     Extraocular Movements: Extraocular movements intact.      Conjunctiva/sclera: Conjunctivae normal.      Pupils: Pupils are equal, round, and reactive to light.   Neck:      Musculoskeletal: Normal range of motion and neck supple.   Cardiovascular:      Rate and Rhythm: Normal rate.   Pulmonary:      Effort: No respiratory distress.   Skin:     General: Skin is warm and dry.      Capillary  Refill: Capillary refill takes less than 2 seconds.      Findings: No erythema or rash.   Neurological:      Mental Status: He is alert and oriented to person, place, and time. Mental status is at baseline.      Comments: Decrease hearing right ear   Psychiatric:         Mood and Affect: Mood normal.         Behavior: Behavior normal.         Thought Content: Thought content normal.         Judgment: Judgment normal.         ED Course        Procedures           Eval in the ER  Irrigation of the right ear.  Reinspection reveal clear canal with white pearly sclerotic nonbulging right tm without perf.  Patient notes some decreased hearing still right ear which may be from water dampening.  Patient ok home with lying on right ear to allow drainage and to monitor sx and follow up with audiology as planned.  ENT f/u sooner if any concerns.                  No results found for this or any previous visit (from the past 24 hour(s)).  Medications - No data to display          Assessments & Plan (with Medical Decision Making)  40 yo male with deafness in left ear and decrease hearing in right ear with concern of cerumen impaction. No infection sx or rash. eval in er with cerumen irrigated with still some decrease hearing with thoughts it may be from water dampening. No sign of infection or perforation or effusion. Patient to lie on right side to drain water and to monitor sx with f/u ent. Has audiology appointment in next few weeks. Return if concerns.          I have reviewed the nursing notes.    I have reviewed the findings, diagnosis, plan and need for follow up with the patient.    Discharge Medication List as of 6/23/2020  3:52 PM          Final diagnoses:   Bilateral hearing loss, unspecified hearing loss type   Impacted cerumen of right ear       6/23/2020   [facility], EMERGENCY DEPARTMENT  This note was created at least in part by the use of dragon voice dictation system. Inadvertent typographical errors may  still exist.  Jorje Brown MD.    Patient evaluated in the emergency department during the COVID-19 pandemic period. Careful attention to patients safety was addressed throughout the evaluation. Evaluation and treatment management was initiated with disposition made efficiently and appropriate as possible to minimize any risk of potential exposure to patient during this evaluation.    .Jorje Kurtz MD  06/23/20 2273

## 2020-06-23 NOTE — ED AVS SNAPSHOT
[facility], [place], Emergency Department  [address]  [facility] [address]  Phone:  [phone]  Fax:  [phone]                                    Juliano Vale   MRN: [id number]    Department:  [facility], Emergency Department   Date of Visit:  6/23/2020           After Visit Summary Signature Page    I have received my discharge instructions, and my questions have been answered. I have discussed any challenges I see with this plan with the nurse or doctor.    ..........................................................................................................................................  Patient/Patient Representative Signature      ..........................................................................................................................................  Patient Representative Print Name and Relationship to Patient    ..................................................               ................................................  Date                                   Time    ..........................................................................................................................................  Reviewed by Signature/Title    ...................................................              ..............................................  Date                                               Time          22EPIC Rev 08/18

## 2020-06-23 NOTE — ED TRIAGE NOTES
Pt states that he is having a hearing problem because he has too much wax in his ear, this has been an ongoing problem. Pt states this is causing him to have a hard time hearing people.

## 2020-06-23 NOTE — DISCHARGE INSTRUCTIONS
Home.  The wax is removed from your good right ear.  There may be some water that is dampening the sound currently.  Home and take hearing aid out and lay on pillow on your right side to let water out for next half hour.  Follow up with the Audiologist appointment that you have scheduled as they need to further test your hearing.  If any concerns call them or return or you can follow up with [facility] for further evaluation.  Please make an appointment to follow up with [facility] (phone: [phone]) as soon as possible.

## 2021-06-14 ENCOUNTER — HOSPITAL ENCOUNTER (EMERGENCY)
Facility: CLINIC | Age: 41
Discharge: HOME | End: 2021-06-14
Attending: EMERGENCY MEDICINE
Payer: COMMERCIAL

## 2021-06-14 VITALS
BODY MASS INDEX: 20.64 KG/M2 | DIASTOLIC BLOOD PRESSURE: 81 MMHG | HEART RATE: 60 BPM | TEMPERATURE: 97.7 F | SYSTOLIC BLOOD PRESSURE: 111 MMHG | OXYGEN SATURATION: 99 % | RESPIRATION RATE: 16 BRPM | WEIGHT: 131.8 LBS

## 2021-06-14 DIAGNOSIS — R68.81 EARLY SATIETY: ICD-10-CM

## 2021-06-14 PROCEDURE — 87338 HPYLORI STOOL AG IA: CPT | Performed by: EMERGENCY MEDICINE

## 2021-06-14 PROCEDURE — 99283 EMERGENCY DEPT VISIT LOW MDM: CPT | Performed by: EMERGENCY MEDICINE

## 2021-06-14 NOTE — ED PROVIDER NOTES
History     Chief Complaint   Patient presents with     Abdominal Pain     Pt states he has digestive issues and it is in his files.     HPI  Juliano Vale is a 40 year old male who presents to the ER with questions about his stomach health. Patient presents to the triage area stating that he will not answer any questions by nursing personnel because all of his data is in the chart. Patient was placed in a cubicle for me to see the patient and again he refused to answer many questions about his past medical history. Looking through the chart the patient was seen 1 year ago and diagnosed with gastritis at which time he was placed on normal medications for 1 month. Patient states he is no longer on those medications but states that ever since that time he has had problems eating as much as he normally is used to eating. Patient states that he is frustrated that he can eat three hamburgers in one setting. Patient denies any weight loss and was reassured that what ever he does eat is enough to not cause significant weight loss. After review of the [facility] records patient was also told that he underwent a right upper quadrant ultrasound done 1 year ago which was normal. Patient was reassured that he does not have gallbladder disease and his concerns about needing a CT scan of his abdomen were resolved and that it is not medically necessary at this time. Patient denies any abdominal pain vomiting diarrhea melena bright blood per rectum and denies any fevers. Patient simply states that he gets full fast and is wondering why.    I have reviewed the Medications, Allergies, Past Medical and Surgical History, and Social History in the Epic system.    No past medical history on file.    No past surgical history on file.     No medications currently      Past medical history, past surgical history, medications, and allergies were reviewed with the patient. Additional pertinent items: None    No family history on  file.    Social History     Tobacco Use     Smoking status: Current Some Day Smoker     Packs/day: 0.00     Smokeless tobacco: Never Used   Substance Use Topics     Alcohol use: Yes     Comment: occasional     Social history was reviewed with the patient. Additional pertinent items: None    No Known Allergies    Review of Systems  A complete review of systems was performed with pertinent positives and negatives noted in the HPI, and all other systems negative.   Patient denies any recreational drug use    Physical Exam   BP: 127/86  Pulse: 65  Temp: 97.7  F (36.5  C)  Resp: 18  Weight: 59.8 kg (131 lb 12.8 oz)  SpO2: 98 %      Physical Exam  Vitals signs and nursing note reviewed.   Constitutional:       Comments: Mildly agitated and easily dogmatic suggestive of an underlying personality disorder   HENT:      Head: Atraumatic.   Eyes:      Extraocular Movements: Extraocular movements intact.      Pupils: Pupils are equal, round, and reactive to light.   Abdominal:      General: Abdomen is flat. There is no distension.      Tenderness: There is no abdominal tenderness.   Skin:     General: Skin is warm.   Neurological:      General: No focal deficit present.      Mental Status: He is alert and oriented to person, place, and time.   Psychiatric:      Comments: Inappropriately argumentative and uncooperative when seeking help         ED Course        Procedures            Assessments & Plan (with Medical Decision Making)     I have reviewed the nursing notes.    With no abdominal pain and only the complaints listed the patient does not need medical testing at this point other than outpatient testing.    Orders Placed This Encounter   Procedures     Helicobacter pylori Antigen Stool     GASTROENTEROLOGY ADULT REF CONSULT ONLY     Primary Care Referral       I have reviewed the findings, diagnosis, plan and need for follow up with the patient.    New Prescriptions    No medications on file       Final diagnoses:   Early  satiety     Please return stool specimen to the lab as soon as possible so it can be tested for H. Pylori.    A referral was placed into the computer system for you to be seen by gastroenterology for probable endoscopy. They should call you in the next 2 days to set up an appointment in the near future to be seen. If they don't call you please call them at [facility] (phone: [phone]) to set up your appointment.    A referral has also been placed in the computer for you to be seen and establish primary care to help coordinate your medical needs. They will call you to help set up a primary care clinic appointment.      Ernie Low MD, MD    6/14/2021   [facility] EMERGENCY DEPARTMENT     Ernie Low MD  06/14/21 1400

## 2021-06-14 NOTE — ED TRIAGE NOTES
Pt has angry affect in triage and did not want to be asked any questions.  Not able to complete triage.  Pt did not want this writer to ask him questions.

## 2021-06-15 LAB — H PYLORI AG STL QL IA: NEGATIVE

## 2021-06-15 NOTE — RESULT ENCOUNTER NOTE
Final report for H Pylori antigen is NEGATIVE.  No treatment per [facility] ED Lab Result H Pylor Protocol.

## 2021-06-16 PROBLEM — H91.92 DEAFNESS IN LEFT EAR: Status: ACTIVE | Noted: 2019-01-15

## 2021-06-16 PROBLEM — H21.561: Status: ACTIVE | Noted: 2017-06-05

## 2021-06-16 PROBLEM — J30.2 SEASONAL ALLERGIES: Status: ACTIVE | Noted: 2019-01-15

## 2021-06-16 PROBLEM — H52.13 MYOPIA OF BOTH EYES: Status: ACTIVE | Noted: 2017-06-05

## 2021-08-03 ENCOUNTER — TELEPHONE (OUTPATIENT)
Dept: GASTROENTEROLOGY | Facility: CLINIC | Age: 41
End: 2021-08-03

## 2021-08-03 NOTE — TELEPHONE ENCOUNTER
Screening Questions  1. Are you active on mychart?    2. What insurance is in the chart? [employer]    2.  Ordering/Referring Provider: Ernie Low MD    3. BMI 21.0    4. Are you on daily home oxygen? n    5. Do you have a history of difficult airway? n    6. Have you had a heart, lung, or liver transplant? n    7. Are you currently on dialysis? n    8. Have you had a stroke or Transient ischemic atttack (TIA) within 6 months? n    9. In the past 6 months, have you had any heart related issues including cardiomyopathy or heart attack?         If yes, did it require cardiac stenting or other implantable device?n    10. Do you have any implantable devices in your body (pacemaker, defib, LVAD)? n    11. Do you take nitroglycerin? If yes, how often? n    12. Are you currently taking any blood thinners?n    13. Are you a diabetic? n    14. (Females) Are you currently pregnant? n  If yes, how many weeks?    15. Have you had a procedure in the past that was difficult to tolerate with conscious sedation? Any allergies to Fentanyl or Versed n    16. Are you taking any scheduled prescription narcotics more than once daily? n    17. Do you have any chemical dependencies such as alcohol, street drugs, or methadone? n    18. Do you have any history of post-traumatic stress syndrome or mental health issues? n    19. Do you transfer independently? y    20.  Do you have any issues with constipation? n    21. Preferred Pharmacy for Pre Prescription       Additional comments: Patient will call back to schedule after he think about when he can do it.

## 2021-08-21 ENCOUNTER — HOSPITAL ENCOUNTER (EMERGENCY)
Facility: CLINIC | Age: 41
Discharge: HOME | End: 2021-08-21
Attending: EMERGENCY MEDICINE | Admitting: EMERGENCY MEDICINE
Payer: COMMERCIAL

## 2021-08-21 VITALS
DIASTOLIC BLOOD PRESSURE: 83 MMHG | OXYGEN SATURATION: 98 % | RESPIRATION RATE: 16 BRPM | HEIGHT: 67 IN | HEART RATE: 74 BPM | BODY MASS INDEX: 20.88 KG/M2 | SYSTOLIC BLOOD PRESSURE: 120 MMHG | WEIGHT: 133 LBS | TEMPERATURE: 98.6 F

## 2021-08-21 DIAGNOSIS — Z20.2 POTENTIAL EXPOSURE TO STD: ICD-10-CM

## 2021-08-21 DIAGNOSIS — R30.0 DYSURIA: ICD-10-CM

## 2021-08-21 LAB
ALBUMIN UR-MCNC: 20 MG/DL
APPEARANCE UR: ABNORMAL
BACTERIA #/AREA URNS HPF: ABNORMAL /HPF
BILIRUB UR QL STRIP: NEGATIVE
COLOR UR AUTO: YELLOW
GLUCOSE UR STRIP-MCNC: NEGATIVE MG/DL
HGB UR QL STRIP: ABNORMAL
KETONES UR STRIP-MCNC: NEGATIVE MG/DL
LEUKOCYTE ESTERASE UR QL STRIP: ABNORMAL
MUCOUS THREADS #/AREA URNS LPF: PRESENT /LPF
NITRATE UR QL: NEGATIVE
PH UR STRIP: 7.5 [PH] (ref 5–7)
RBC URINE: 28 /HPF
SP GR UR STRIP: 1.02 (ref 1–1.03)
UROBILINOGEN UR STRIP-MCNC: NORMAL MG/DL
WBC URINE: >182 /HPF

## 2021-08-21 PROCEDURE — 99284 EMERGENCY DEPT VISIT MOD MDM: CPT | Performed by: EMERGENCY MEDICINE

## 2021-08-21 PROCEDURE — 99283 EMERGENCY DEPT VISIT LOW MDM: CPT | Performed by: EMERGENCY MEDICINE

## 2021-08-21 PROCEDURE — 87591 N.GONORRHOEAE DNA AMP PROB: CPT | Performed by: EMERGENCY MEDICINE

## 2021-08-21 PROCEDURE — 81001 URINALYSIS AUTO W/SCOPE: CPT | Performed by: EMERGENCY MEDICINE

## 2021-08-21 PROCEDURE — 250N000009 HC RX 250: Performed by: EMERGENCY MEDICINE

## 2021-08-21 PROCEDURE — 250N000011 HC RX IP 250 OP 636: Performed by: EMERGENCY MEDICINE

## 2021-08-21 PROCEDURE — 87086 URINE CULTURE/COLONY COUNT: CPT | Performed by: EMERGENCY MEDICINE

## 2021-08-21 PROCEDURE — 87491 CHLMYD TRACH DNA AMP PROBE: CPT | Performed by: EMERGENCY MEDICINE

## 2021-08-21 PROCEDURE — 96372 THER/PROPH/DIAG INJ SC/IM: CPT | Performed by: EMERGENCY MEDICINE

## 2021-08-21 RX ORDER — DOXYCYCLINE HYCLATE 100 MG
100 TABLET ORAL 2 TIMES DAILY
Qty: 20 TABLET | Refills: 0 | Status: SHIPPED | OUTPATIENT
Start: 2021-08-21 | End: 2021-08-31

## 2021-08-21 RX ADMIN — LIDOCAINE HYDROCHLORIDE 250 MG: 10 INJECTION, SOLUTION EPIDURAL; INFILTRATION; INTRACAUDAL; PERINEURAL at 15:10

## 2021-08-21 ASSESSMENT — ENCOUNTER SYMPTOMS: DYSURIA: 1

## 2021-08-21 ASSESSMENT — MIFFLIN-ST. JEOR: SCORE: 1471.91

## 2021-08-21 NOTE — ED NOTES
Pt in an extreme hurry to leave. He declined VS and he did not sign discharge paperwork. Instructions given verbally

## 2021-08-21 NOTE — DISCHARGE INSTRUCTIONS
You have been started on treatment for gonorrhea and chlamydia.    Take the medications as prescribed.    Return to the ER if any other problems/concerns.     Practice safe sex habits.

## 2021-08-21 NOTE — ED PROVIDER NOTES
ED Provider Note  [facility]      History     Chief Complaint   Patient presents with     Dysuria     Pt states ongoing for 2 days and also having discharge.     The history is provided by the patient.     Juliano Vale is a 41 year old male who presents to the ED for an evaluation of dysuria. Patient reports dysuria for a couple of days and white discharge from around his penis. He notes he believes he had sexual relations with someone who had gonorrhea, but he can't say for sure. Denies any allergies to antibiotics. Pt says that he has had gonorrhea once before.     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 Possibly with a hint of angle recession temporally and slightly asymmetric IOP and C/D ratio. However, no donis indications of angle recession glaucoma at this time.  Gonio (6/5/2017):   Right eye - angle open 360, d35 regular. There is an area superotemporally where the angle appe     Seasonal allergies 1/15/2019     History reviewed. No pertinent surgical history.  doxycycline hyclate (VIBRA-TABS) 100 MG tablet      No Known Allergies  Family History  No family history on file.  Social History   Social History     Tobacco Use     Smoking status: Current Every Day Smoker     Packs/day: 0.50     Types: Cigarettes     Smokeless tobacco: Never Used   Substance Use Topics     Alcohol use: Not Currently     Drug use: Never      Past medical history, past surgical history, medications, allergies, family history, and social  "history were reviewed with the patient. No additional pertinent items.       Review of Systems   Genitourinary: Positive for dysuria.   All other systems reviewed and are negative.    A complete review of systems was performed with pertinent positives and negatives noted in the HPI, and all other systems negative.    Physical Exam   BP: 120/83  Pulse: 74  Temp: 98.6  F (37  C)  Resp: 16  Height: 170.2 cm (5' 7\")  Weight: 60.3 kg (133 lb)  SpO2: 98 %  Physical Exam  Constitutional:       General: He is not in acute distress.     Appearance: Normal appearance. He is not ill-appearing or toxic-appearing.   HENT:      Head: Normocephalic and atraumatic.   Pulmonary:      Effort: No respiratory distress.   Abdominal:      General: There is no distension.      Tenderness: There is no abdominal tenderness. There is no guarding.   Genitourinary:     Comments: Whitish discharge from the tip of the penis.  Penis is circumcised.  No other lesions or chancres on the penis or on the scrotum.  Musculoskeletal:         General: No tenderness or deformity.      Cervical back: Normal range of motion and neck supple.   Skin:     General: Skin is warm.   Neurological:      General: No focal deficit present.      Mental Status: He is oriented to person, place, and time. Mental status is at baseline.   Psychiatric:         Mood and Affect: Mood normal.         Behavior: Behavior normal.         Thought Content: Thought content normal.         Judgment: Judgment normal.         ED Course      Procedures       The medical record was reviewed and interpreted.  Current labs reviewed and interpreted.  Previous labs reviewed and interpreted.  Results for orders placed or performed during the hospital encounter of 08/21/21   UA with Microscopic reflex to Culture     Status: Abnormal    Specimen: Urine, Midstream   Result Value Ref Range    Color Urine Yellow Colorless, Straw, Light Yellow, Yellow    Appearance Urine Slightly Cloudy (A) Clear "    Glucose Urine Negative Negative mg/dL    Bilirubin Urine Negative Negative    Ketones Urine Negative Negative mg/dL    Specific Gravity Urine 1.025 1.003 - 1.035    Blood Urine Small (A) Negative    pH Urine 7.5 (H) 5.0 - 7.0    Protein Albumin Urine 20  (A) Negative mg/dL    Urobilinogen Urine Normal Normal, 2.0 mg/dL    Nitrite Urine Negative Negative    Leukocyte Esterase Urine Large (A) Negative    Bacteria Urine Few (A) None Seen /HPF    Mucus Urine Present (A) None Seen /LPF    RBC Urine 28 (H) <=2 /HPF    WBC Urine >182 (H) <=5 /HPF    Narrative    Urine Culture ordered based on laboratory criteria          Results for orders placed or performed during the hospital encounter of 08/21/21   UA with Microscopic reflex to Culture     Status: Abnormal    Specimen: Urine, Midstream   Result Value Ref Range    Color Urine Yellow Colorless, Straw, Light Yellow, Yellow    Appearance Urine Slightly Cloudy (A) Clear    Glucose Urine Negative Negative mg/dL    Bilirubin Urine Negative Negative    Ketones Urine Negative Negative mg/dL    Specific Gravity Urine 1.025 1.003 - 1.035    Blood Urine Small (A) Negative    pH Urine 7.5 (H) 5.0 - 7.0    Protein Albumin Urine 20  (A) Negative mg/dL    Urobilinogen Urine Normal Normal, 2.0 mg/dL    Nitrite Urine Negative Negative    Leukocyte Esterase Urine Large (A) Negative    Bacteria Urine Few (A) None Seen /HPF    Mucus Urine Present (A) None Seen /LPF    RBC Urine 28 (H) <=2 /HPF    WBC Urine >182 (H) <=5 /HPF    Narrative    Urine Culture ordered based on laboratory criteria     Medications - No data to display     Assessments & Plan (with Medical Decision Making)   Patient is a 41-year-old male who presents to the ER due to concern for possibly having gonorrhea.  Patient had a known exposure and is having penile discharge with dysuria.  Patient will receive 1 IM dose of ceftriaxone will be discharged home with doxycycline.  Patient verbalized understanding and agrees  with plan of care.  No other signs of acute abdomen or other acute issues at this time.  Patient stable for discharge    I have reviewed the nursing notes. I have reviewed the findings, diagnosis, plan and need for follow up with the patient.    New Prescriptions    No medications on file       Final diagnoses:   Dysuria   Potential exposure to STD       --  I, Maggie Mai, am serving as a trained medical scribe to document services personally performed by Janessa Castorena MD, based on the provider's statements to me.     I, Janessa Castorena MD, was physically present and have reviewed and verified the accuracy of this note documented by Maggie Mai.    Janessa Castorena MD  [facility] EMERGENCY DEPARTMENT  8/21/2021     Janessa Castorena MD  08/21/21 [id number]

## 2021-08-22 ENCOUNTER — TELEPHONE (OUTPATIENT)
Dept: EMERGENCY MEDICINE | Facility: CLINIC | Age: 41
End: 2021-08-22

## 2021-08-22 LAB
BACTERIA UR CULT: NO GROWTH
C TRACH DNA SPEC QL NAA+PROBE: POSITIVE
N GONORRHOEA DNA SPEC QL NAA+PROBE: POSITIVE

## 2021-08-22 NOTE — TELEPHONE ENCOUNTER
[facility] Emergency Department/Urgent Care Lab result notification:    [place] ED lab result protocol used  N. Gonorrhea and Chlamydia protocols    Reason for call  Notify of lab results, assess symptoms,  review ED providers recommendations/discharge instructions (if necessary) and advise per ED lab result f/u protocol    Lab Result   Final N. Gonorrhoeae PCR is [POSITIVE] AND Chlamydia T PCR is [POSITIVE]  Patient was treated for N. Gonorrhea AND/OR Chlamydia T in the [facility] Emergency Dept  [Yes or No]:  Yes       If Yes, list what was given in the ED (dual treatment for N Gonorrhea):  Ceftriaxone (Rocephin) 250 mg IM x 1  [facility] Emergency Dept discharge antibiotic (if prescribed): Doxycycline 100 mg PO BID for 10 days  Recommendations in treatment per [facility] ED lab result Chlamydia T. AND/OR N. Gonorrhea protocol  Information table from Emergency Dept Provider visit on 8/21/21  Symptoms reported at ED visit (Chief complaint, HPI) Dysuria        Pt states ongoing for 2 days and also having discharge.      The history is provided by the patient.      Juliano Vale is a 41 year old male who presents to the ED for an evaluation of dysuria. Patient reports dysuria for a couple of days and white discharge from around his penis. He notes he believes he had sexual relations with someone who had gonorrhea, but he can't say for sure. Denies any allergies to antibiotics. Pt says that he has had gonorrhea once before.      ED providers Impression and Plan (applicable information) Patient is a 41-year-old male who presents to the ER due to concern for possibly having gonorrhea.  Patient had a known exposure and is having penile discharge with dysuria.  Patient will receive 1 IM dose of ceftriaxone will be discharged home with doxycycline.  Patient verbalized understanding and agrees with plan of care.  No other signs of acute abdomen or other acute issues at this time.  Patient  stable for discharge   Miscellaneous information na     RN Assessment (Patient s current Symptoms), include time called.  [Insert Left message here if message left]  11:32AM: Spoke with patient. He states he is improving. Still has some swelling at the head of the penis, but it has improved.   RN Recommendations/Instructions per [place] ED lab result protocol  Patient notified of lab result and treatment recommendations.   RN reviewed information about N. Gonorrhea and Chlamydia from protocol RN action/patient education.     STD Patient Instructions:    We recommend that you contact any recent sexual partners within the last 2 months and have them evaluated by a physician.    Avoid sexual activity for 7 to 10 days or until both you and your partner(s) have completed all antibiotic medications.    We advise that you consider following up with your PCP at approximately 3 months for retesting to be sure the infection has cleared.    Advised to continue taking the oral antibiotic as directed.   Advised to follow up with a PCP as directed by the ED provider.  The patient is comfortable with the information given and has no further questions.     Please Contact your PCP clinic or return to the Emergency department if your:    Symptoms do not improve after 3 days on antibiotic.    Symptoms worsen or other concerning symptom's.    PCP follow-up Questions asked: YES       Ema Boyd RN  [facility] [employer] [place]  Emergency Dept Lab Result RN  Ph# [phone]     Copy of Lab result

## 2021-08-25 ENCOUNTER — NURSE TRIAGE (OUTPATIENT)
Dept: NURSING | Facility: CLINIC | Age: 41
End: 2021-08-25

## 2021-08-25 NOTE — TELEPHONE ENCOUNTER
Juliano is calling and is being treated for gonorrhea and Chlamydia and is taking.  Patient denies fever cough and shortness of breath.  Patient denies painful urination and redness or bleeding and denies discharge.  Patient has questions on his medication Doxycycline Hylate.  Patient states that the tip of his penis is swollen but not painful.  FNA advised to finish medication and to phone back if not feeling better and patient agreed.    COVID 19 Nurse Triage Plan/Patient Instructions    Please be aware that novel coronavirus (COVID-19) may be circulating in the community. If you develop symptoms such as fever, cough, or SOB or if you have concerns about the presence of another infection including coronavirus (COVID-19), please contact your health care provider or visit [email or web address].     Disposition/Instructions    Home care recommended. Follow home care protocol based instructions.    Thank you for taking steps to prevent the spread of this virus.  o Limit your contact with others.  o Wear a simple mask to cover your cough.  o Wash your hands well and often.    Resources    [facility]: About COVID-19: [email or web address]    CDC: What to Do If You're Sick: www.cdc.gov/coronavirus/2019-ncov/about/steps-when-sick.html    CDC: Ending Home Isolation: www.cdc.gov/coronavirus/2019-ncov/hcp/disposition-in-home-patients.html     CDC: Caring for Someone: www.cdc.gov/coronavirus/2019-ncov/if-you-are-sick/care-for-someone.html     [facility]: Interim Guidance for Hospital Discharge to Home: www.health.[facility].[place]/diseases/coronavirus/hcp/hospdischarge.pdf    [facility] clinical trials (COVID-19 research studies): [email or web address]-trials     Below are the COVID-19 hotlines at the [place] Department of Health ([facility]). Interpreters are available.   o For health questions: Call 042-680-9209 or 1-909.648.2649 (7 a.m. to 7 p.m.)  o For questions about schools and  childcare: Call 907-799-6584 or 1-636.768.4361 (7 a.m. to 7 p.m.)                       Reason for Disposition    Painless rash (e.g., mild redness, tiny bumps, small sore) present < 24 hours    Additional Information    Negative: SEVERE pain (e.g., excruciating)    Negative: Large amount of blood from end of penis    Negative: Foreskin pulled back and stuck (not circumcised)    Negative: Fever > 100.4 F (38.0 C)    Negative: Unable to urinate (or only a few drops) and bladder feels very full    Negative: Prolonged unwanted erection (i.e., not related to sexual interest) and lasting > 4 hours    Negative: Patient sounds very sick or weak to the triager    Negative: Entire penis is swollen (i.e., edema)    Negative: Looks infected (e.g., draining sore, spreading redness)    Negative: Pain or burning with passing urine (urination)    Negative: Blood in urine (red, pink, or tea-colored)    Negative: Pus (white, yellow) or bloody discharge from end of penis    Negative: Swollen foreskin (not circumcised)    Negative: Tiny water blisters rash, 3 or more    Negative: Antibiotic treatment > 3 days for STD (e.g., penile discharge from gonorrhea, chlamydia) and painful urination not improved    Negative: Patient wants to be seen    Negative: SEVERE itching (i.e., interferes with work or school)    Negative: Painless rash (e.g., redness, tiny bumps, sore) present > 24 hours    Negative: Blood in semen    Negative: Prolonged unwanted erection (i.e., not related to sexual interest) and lasting < 4 hours    Negative: Patient is worried about a sexually transmitted disease (STD)    Negative: ALL other penis - scrotum symptoms  (Exception: painless rash < 24 hours duration)    Protocols used: PENIS AND SCROTUM SYMPTOMS-A-[place]

## 2022-03-12 ENCOUNTER — APPOINTMENT (OUTPATIENT)
Dept: CT IMAGING | Facility: CLINIC | Age: 42
End: 2022-03-12
Attending: EMERGENCY MEDICINE
Payer: COMMERCIAL

## 2022-03-12 ENCOUNTER — HOSPITAL ENCOUNTER (EMERGENCY)
Facility: CLINIC | Age: 42
Discharge: HOME | End: 2022-03-12
Attending: EMERGENCY MEDICINE | Admitting: EMERGENCY MEDICINE
Payer: COMMERCIAL

## 2022-03-12 VITALS
TEMPERATURE: 97.3 F | SYSTOLIC BLOOD PRESSURE: 140 MMHG | HEART RATE: 52 BPM | OXYGEN SATURATION: 100 % | WEIGHT: 140 LBS | RESPIRATION RATE: 16 BRPM | HEIGHT: 67 IN | BODY MASS INDEX: 21.97 KG/M2 | DIASTOLIC BLOOD PRESSURE: 94 MMHG

## 2022-03-12 DIAGNOSIS — R68.81 EARLY SATIETY: ICD-10-CM

## 2022-03-12 DIAGNOSIS — R10.13 DYSPEPSIA: ICD-10-CM

## 2022-03-12 LAB
ALBUMIN SERPL-MCNC: 4.3 G/DL (ref 3.4–5)
ALBUMIN UR-MCNC: 30 MG/DL
ALP SERPL-CCNC: 79 U/L (ref 40–150)
ALT SERPL W P-5'-P-CCNC: 21 U/L (ref 0–70)
ANION GAP SERPL CALCULATED.3IONS-SCNC: 3 MMOL/L (ref 3–14)
APPEARANCE UR: ABNORMAL
AST SERPL W P-5'-P-CCNC: 18 U/L (ref 0–45)
BASOPHILS # BLD AUTO: 0 10E3/UL (ref 0–0.2)
BASOPHILS NFR BLD AUTO: 0 %
BILIRUB SERPL-MCNC: 0.4 MG/DL (ref 0.2–1.3)
BILIRUB UR QL STRIP: NEGATIVE
BUN SERPL-MCNC: 11 MG/DL (ref 7–30)
CALCIUM SERPL-MCNC: 9.1 MG/DL (ref 8.5–10.1)
CHLORIDE BLD-SCNC: 106 MMOL/L (ref 94–109)
CO2 SERPL-SCNC: 29 MMOL/L (ref 20–32)
COLOR UR AUTO: YELLOW
CREAT SERPL-MCNC: 0.93 MG/DL (ref 0.66–1.25)
EOSINOPHIL # BLD AUTO: 0 10E3/UL (ref 0–0.7)
EOSINOPHIL NFR BLD AUTO: 0 %
ERYTHROCYTE [DISTWIDTH] IN BLOOD BY AUTOMATED COUNT: 13.3 % (ref 10–15)
GFR SERPL CREATININE-BSD FRML MDRD: >90 ML/MIN/1.73M2
GLUCOSE BLD-MCNC: 86 MG/DL (ref 70–99)
GLUCOSE UR STRIP-MCNC: NEGATIVE MG/DL
HCT VFR BLD AUTO: 45.4 % (ref 40–53)
HGB BLD-MCNC: 14.3 G/DL (ref 13.3–17.7)
HGB UR QL STRIP: NEGATIVE
HOLD SPECIMEN: NORMAL
IMM GRANULOCYTES # BLD: 0 10E3/UL
IMM GRANULOCYTES NFR BLD: 0 %
KETONES UR STRIP-MCNC: ABNORMAL MG/DL
LEUKOCYTE ESTERASE UR QL STRIP: NEGATIVE
LIPASE SERPL-CCNC: 69 U/L (ref 73–393)
LYMPHOCYTES # BLD AUTO: 3.2 10E3/UL (ref 0.8–5.3)
LYMPHOCYTES NFR BLD AUTO: 45 %
MCH RBC QN AUTO: 28.4 PG (ref 26.5–33)
MCHC RBC AUTO-ENTMCNC: 31.5 G/DL (ref 31.5–36.5)
MCV RBC AUTO: 90 FL (ref 78–100)
MONOCYTES # BLD AUTO: 0.4 10E3/UL (ref 0–1.3)
MONOCYTES NFR BLD AUTO: 6 %
MUCOUS THREADS #/AREA URNS LPF: PRESENT /LPF
NEUTROPHILS # BLD AUTO: 3.3 10E3/UL (ref 1.6–8.3)
NEUTROPHILS NFR BLD AUTO: 49 %
NITRATE UR QL: NEGATIVE
NRBC # BLD AUTO: 0 10E3/UL
NRBC BLD AUTO-RTO: 0 /100
PH UR STRIP: 6 [PH] (ref 5–7)
PLATELET # BLD AUTO: 198 10E3/UL (ref 150–450)
POTASSIUM BLD-SCNC: 4.4 MMOL/L (ref 3.4–5.3)
PROT SERPL-MCNC: 7.8 G/DL (ref 6.8–8.8)
RBC # BLD AUTO: 5.03 10E6/UL (ref 4.4–5.9)
RBC URINE: 1 /HPF
SODIUM SERPL-SCNC: 138 MMOL/L (ref 133–144)
SP GR UR STRIP: 1.04 (ref 1–1.03)
SQUAMOUS EPITHELIAL: 1 /HPF
UROBILINOGEN UR STRIP-MCNC: NORMAL MG/DL
WBC # BLD AUTO: 7 10E3/UL (ref 4–11)
WBC URINE: <1 /HPF

## 2022-03-12 PROCEDURE — 250N000013 HC RX MED GY IP 250 OP 250 PS 637: Performed by: EMERGENCY MEDICINE

## 2022-03-12 PROCEDURE — 81001 URINALYSIS AUTO W/SCOPE: CPT | Performed by: EMERGENCY MEDICINE

## 2022-03-12 PROCEDURE — 74177 CT ABD & PELVIS W/CONTRAST: CPT

## 2022-03-12 PROCEDURE — 83690 ASSAY OF LIPASE: CPT | Performed by: EMERGENCY MEDICINE

## 2022-03-12 PROCEDURE — 250N000009 HC RX 250: Performed by: EMERGENCY MEDICINE

## 2022-03-12 PROCEDURE — 99285 EMERGENCY DEPT VISIT HI MDM: CPT | Mod: 25

## 2022-03-12 PROCEDURE — 82040 ASSAY OF SERUM ALBUMIN: CPT | Performed by: EMERGENCY MEDICINE

## 2022-03-12 PROCEDURE — 85025 COMPLETE CBC W/AUTO DIFF WBC: CPT | Performed by: EMERGENCY MEDICINE

## 2022-03-12 PROCEDURE — 36415 COLL VENOUS BLD VENIPUNCTURE: CPT | Performed by: EMERGENCY MEDICINE

## 2022-03-12 PROCEDURE — 250N000011 HC RX IP 250 OP 636: Performed by: EMERGENCY MEDICINE

## 2022-03-12 PROCEDURE — 80053 COMPREHEN METABOLIC PANEL: CPT | Performed by: EMERGENCY MEDICINE

## 2022-03-12 PROCEDURE — 96374 THER/PROPH/DIAG INJ IV PUSH: CPT | Mod: 59

## 2022-03-12 RX ORDER — IOPAMIDOL 755 MG/ML
68 INJECTION, SOLUTION INTRAVASCULAR ONCE
Status: COMPLETED | OUTPATIENT
Start: 2022-03-12 | End: 2022-03-12

## 2022-03-12 RX ORDER — METOCLOPRAMIDE 10 MG/1
10 TABLET ORAL 3 TIMES DAILY PRN
Qty: 30 TABLET | Refills: 0 | Status: SHIPPED | OUTPATIENT
Start: 2022-03-12

## 2022-03-12 RX ORDER — SUCRALFATE ORAL 1 G/10ML
1 SUSPENSION ORAL ONCE
Status: COMPLETED | OUTPATIENT
Start: 2022-03-12 | End: 2022-03-12

## 2022-03-12 RX ORDER — SUCRALFATE 1 G/1
1 TABLET ORAL 4 TIMES DAILY
Qty: 56 TABLET | Refills: 0 | Status: SHIPPED | OUTPATIENT
Start: 2022-03-12 | End: 2022-03-26

## 2022-03-12 RX ORDER — FAMOTIDINE 40 MG/1
40 TABLET, FILM COATED ORAL DAILY
Qty: 14 TABLET | Refills: 0 | Status: SHIPPED | OUTPATIENT
Start: 2022-03-12 | End: 2022-03-26

## 2022-03-12 RX ADMIN — FAMOTIDINE 20 MG: 10 INJECTION, SOLUTION INTRAVENOUS at 17:26

## 2022-03-12 RX ADMIN — SUCRALFATE ORAL 1 G: 1 SUSPENSION ORAL at 18:09

## 2022-03-12 RX ADMIN — SODIUM CHLORIDE 61 ML: 900 INJECTION INTRAVENOUS at 17:43

## 2022-03-12 RX ADMIN — IOPAMIDOL 68 ML: 755 INJECTION, SOLUTION INTRAVENOUS at 17:43

## 2022-03-12 ASSESSMENT — ENCOUNTER SYMPTOMS
FEVER: 0
VOMITING: 0
ABDOMINAL PAIN: 1
BLOOD IN STOOL: 0

## 2022-03-12 NOTE — ED NOTES
Pt had call light on and wanted to see a doctor right now - I talked with him about how there are a few new pt's in the ED that the doctors will be seeing - he was under the impression that the minute he came back here to the main ED he would see a provider. Pt has been reassured that a provider will be coming. Urine has been collected and sent . Pt is visibly upset . Stated he cannot eat and he does not understand why .

## 2022-03-12 NOTE — ED PROVIDER NOTES
History     Chief Complaint:  Abdominal Pain and Nausea       HPI   Juliano Vale is a 41 year old male who presents with abdominal discomfort and a sensation that he cannot swallow his food.  He states he had similar episodes in the past and was told he is got gastroesophageal reflux disease.  He has never had an endoscopy.  He states that he feels the need to eat cannot tolerate p.o. intake.  He does not feel like anything is blocking.  He denies severe pain but does relay some discomfort.  He has had no nausea or vomiting or blood in the stool.  There are no further aggravating or alleviating factors no further associated symptoms.    ROS:  Review of Systems   Constitutional: Negative for fever.   Gastrointestinal: Positive for abdominal pain. Negative for blood in stool and vomiting.   All other systems reviewed and are negative.      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     Family History:    family history is not on file.    Social History:   reports that he has been smoking cigarettes. He has been smoking about 0.50 packs per day. He has never used smokeless tobacco. He reports previous alcohol use. He reports that he does not use drugs.  PCP: No Ref-Primary, Physician     Physical Exam     Patient Vitals for the past 24 hrs:   BP Temp Temp src Pulse Resp SpO2 Height Weight   03/12/22 1433 123/74 97.3  F  "(36.3  C) Temporal 53 16 100 % 1.702 m (5' 7\") 63.5 kg (140 lb)        Physical Exam  General: Alert, interactive in mild distress  Head:  Scalp is atraumatic  Eyes:  The pupils are equal, round, and reactive to light    EOM's intact    No scleral icterus  ENT:      Nose:  The external nose is normal  Ears:  External ears are normal  Mouth/Throat: The oropharynx is normal    Mucus membranes are moist    Airway is widely patent  Neck:  Normal range of motion.      There is no rigidity.    Trachea is in the midline         CV:  Regular rate and rhythm    No murmur   Resp:  Breath sounds are clear bilaterally    Non-labored, no retractions or accessory muscle use      GI:  Abdomen is soft, no distension, mild epigastric tenderness.       MS:  Normal strength in all 4 extremities  Skin:  Warm and dry, No rash or lesions noted.  Neuro: Strength 5/5 x4.   Psych:  Awake. Alert.  Mildly anxious affect.      Appropriate interactions.      Emergency Department Course   Imaging:  CT Chest/Abdomen/Pelvis w Contrast   Final Result   IMPRESSION:   1.  Negative CT chest, abdomen and pelvis. No findings to account for the patient's symptoms.         Report per radiology    Laboratory:  Labs Ordered and Resulted from Time of ED Arrival to Time of ED Departure   LIPASE - Abnormal       Result Value    Lipase 69 (*)    ROUTINE UA WITH MICROSCOPIC REFLEX TO CULTURE - Abnormal    Color Urine Yellow      Appearance Urine Cloudy (*)     Glucose Urine Negative      Bilirubin Urine Negative      Ketones Urine Trace (*)     Specific Gravity Urine 1.038 (*)     Blood Urine Negative      pH Urine 6.0      Protein Albumin Urine 30  (*)     Urobilinogen Urine Normal      Nitrite Urine Negative      Leukocyte Esterase Urine Negative      Mucus Urine Present (*)     RBC Urine 1      WBC Urine <1      Squamous Epithelials Urine 1     COMPREHENSIVE METABOLIC PANEL - Normal    Sodium 138      Potassium 4.4      Chloride 106      Carbon Dioxide (CO2) " 29      Anion Gap 3      Urea Nitrogen 11      Creatinine 0.93      Calcium 9.1      Glucose 86      Alkaline Phosphatase 79      AST 18      ALT 21      Protein Total 7.8      Albumin 4.3      Bilirubin Total 0.4      GFR Estimate >90     CBC WITH PLATELETS AND DIFFERENTIAL    WBC Count 7.0      RBC Count 5.03      Hemoglobin 14.3      Hematocrit 45.4      MCV 90      MCH 28.4      MCHC 31.5      RDW 13.3      Platelet Count 198      % Neutrophils 49      % Lymphocytes 45      % Monocytes 6      % Eosinophils 0      % Basophils 0      % Immature Granulocytes 0      NRBCs per 100 WBC 0      Absolute Neutrophils 3.3      Absolute Lymphocytes 3.2      Absolute Monocytes 0.4      Absolute Eosinophils 0.0      Absolute Basophils 0.0      Absolute Immature Granulocytes 0.0      Absolute NRBCs 0.0        Emergency Department Course:  Reviewed:  I reviewed nursing notes, vitals and past medical history    Assessments:  I obtained history and examined the patient as noted above.   I rechecked the patient and explained findings.       Interventions:  Medications   famotidine (PEPCID) injection 20 mg (20 mg Intravenous Given 3/12/22 1726)   sucralfate (CARAFATE) suspension 1 g (1 g Oral Given 3/12/22 1809)   Saline (61 mLs As instructed Given 3/12/22 1743)   iopamidol (ISOVUE-370) solution 68 mL (68 mLs Intravenous Given 3/12/22 1743)        Disposition:  The patient was discharged to home.     Impression & Plan    Medical Decision Making:  Following presentation history and physical examination were performed, previous records were reviewed.  The above work-up was undertaken demonstrating no obvious abnormality.  He has no peritoneal signs and a negative CT scan of the abdomen pelvis and I believe he needs any further work-up in the emergency department.  He has been told he has gastroesophageal reflux disease in the past however is never had an endoscopy.  I think he would likely benefit from this procedure.  I will  discharge him on the medications below and I provided the phone number for gastroenterology as well as completed primary care referral.  There is no signs of airway compromise, infectious etiology, more concerning illness.  He was subsequently discharged home.  Diagnosis:    ICD-10-CM    1. Dyspepsia  R10.13 Primary Care Referral   2. Early satiety  R68.81 Primary Care Referral        Discharge Medications:  Discharge Medication List as of 3/12/2022  6:44 PM      START taking these medications    Details   famotidine (PEPCID) 40 MG tablet Take 1 tablet (40 mg) by mouth daily for 14 days, Disp-14 tablet, R-0, Local Print      metoclopramide (REGLAN) 10 MG tablet Take 1 tablet (10 mg) by mouth 3 times daily as needed (Nausea/Vomiting, difficulty swallowing), Disp-30 tablet, R-0, Local Print      sucralfate (CARAFATE) 1 GM tablet Take 1 tablet (1 g) by mouth 4 times daily for 14 days, Disp-56 tablet, R-0, Local Print              3/12/2022   TriggerHugh,*      Hugh Cherry MD  03/12/22 2046

## 2022-03-13 NOTE — ED NOTES
Pt put the call light on several times upset that he has to have the IV access left in his arm after having CT scan. Explained to pt importance of having IV access available if needed for further treatment. Pt arguing and adamant of having IV taken out. Removed IV. Informed pt for the 2nd time waiting for results of scan

## 2022-04-06 ENCOUNTER — TELEPHONE (OUTPATIENT)
Dept: HEALTH INFORMATION MANAGEMENT | Facility: CLINIC | Age: 42
End: 2022-04-06
Payer: COMMERCIAL

## 2022-04-06 NOTE — TELEPHONE ENCOUNTER
Pt called clinic requesting refill of medications that he recently received from ED.  Pt is not an established pt in [facility]. Told pt to call and schedule an appt to establish care and if meds are needed in mean time to seek attention at urgent care.     Mayela GRIMM RN  EP Triage

## 2022-07-15 ENCOUNTER — HOSPITAL ENCOUNTER (EMERGENCY)
Facility: CLINIC | Age: 42
Discharge: HOME | End: 2022-07-15
Attending: EMERGENCY MEDICINE | Admitting: EMERGENCY MEDICINE
Payer: COMMERCIAL

## 2022-07-15 VITALS
RESPIRATION RATE: 16 BRPM | HEART RATE: 87 BPM | SYSTOLIC BLOOD PRESSURE: 131 MMHG | DIASTOLIC BLOOD PRESSURE: 92 MMHG | TEMPERATURE: 98.6 F | OXYGEN SATURATION: 98 %
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
## 2022-07-15 DIAGNOSIS — H10.89 CONTACT LENS RELATED CONJUNCTIVITIS: ICD-10-CM

## 2022-07-15 PROCEDURE — 99282 EMERGENCY DEPT VISIT SF MDM: CPT | Performed by: EMERGENCY MEDICINE

## 2022-07-15 PROCEDURE — 99284 EMERGENCY DEPT VISIT MOD MDM: CPT | Performed by: EMERGENCY MEDICINE

## 2022-07-15 RX ORDER — CIPROFLOXACIN HYDROCHLORIDE 3.5 MG/ML
1-2 SOLUTION/ DROPS TOPICAL EVERY 4 HOURS
Qty: 4.2 ML | Refills: 0 | Status: SHIPPED | OUTPATIENT
Start: 2022-07-15 | End: 2022-07-22

## 2022-07-15 ASSESSMENT — ENCOUNTER SYMPTOMS
EYE DISCHARGE: 0
VOMITING: 0
EYE PAIN: 0
PHOTOPHOBIA: 0
EYE ITCHING: 0
FEVER: 0
HEADACHES: 1
EYE REDNESS: 1
NAUSEA: 0
ABDOMINAL PAIN: 0
CHILLS: 0
FACIAL SWELLING: 0

## 2022-07-15 NOTE — ED PROVIDER NOTES
ED Provider Note  [facility]      History     Chief Complaint   Patient presents with     Eye Pain     Red eyes reddened; watering; eye drops not helping; giving him a headache     HPI  Juliano Vale is a 42 year old male who presented with bilateral eye redness and headache. Mr. Vale said that 3-4 days ago he had a bottle of wine and subsequently fell asleep in his contacts. He said this redness in his eyes have been present since then. He has had episodes in the past where he has fallen asleep with his contacts and develops redness but normally this is relieved on it's own after a couple of hours. He said this time the redness did not go away. He then tried over the counter lubricating eye drops and that also did not provide relief. Mr. Vale then continued to developed headache in the b/l periorbital and temporal regions.   His worsening headache is what prompted him to come in today. He did endorse some congestion yesterday but said that is now gone. He denied any trauma to head. He also denied any pain to the eye apart from the periorbital headache. He denied any  feeling of foreign body sensation or change in visual acuity. Denied any fever, chills, nausea, vomiting, chest pain, sob, diarrhea/constipation, dysuria/hematuria.     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 Possibly with a hint of angle recession temporally and slightly asymmetric IOP and C/D ratio. However, no donis indications of angle recession  glaucoma at this time.  Gonio (6/5/2017):   Right eye - angle open 360, d35 regular. There is an area superotemporally where the angle appe     Seasonal allergies 1/15/2019     No past surgical history on file.  ciprofloxacin (CILOXAN) 0.3 % ophthalmic solution  metoclopramide (REGLAN) 10 MG tablet      Allergies   Allergen Reactions     Orange Fruit [Citrus]      Family History  No family history on file.  Social History   Social History     Tobacco Use     Smoking status: Current Every Day Smoker     Packs/day: 0.50     Types: Cigarettes     Smokeless tobacco: Never Used   Substance Use Topics     Alcohol use: Not Currently     Drug use: Never      Past medical history, past surgical history, medications, allergies, family history, and social history were reviewed with the patient. No additional pertinent items.       Review of Systems   Constitutional: Negative for chills and fever.   HENT: Negative for facial swelling.    Eyes: Positive for redness. Negative for photophobia, pain, discharge, itching and visual disturbance.   Gastrointestinal: Negative for abdominal pain, nausea and vomiting.   Neurological: Positive for headaches.     A complete review of systems was performed with pertinent positives and negatives noted in the HPI, and all other systems negative.    Physical Exam   BP: (!) 131/92  Pulse: 87  Temp: 98.6  F (37  C)  Resp: 16  SpO2: 98 %  Physical Exam  Constitutional:       Appearance: Normal appearance.   HENT:      Head: Normocephalic and atraumatic.   Eyes:      Extraocular Movements: Extraocular movements intact.      Pupils: Pupils are equal, round, and reactive to light.      Comments: B/l conjunctivitis.   No apparent discharge  Appropriate Visual Acuity  No apparent corneal opacities   Cardiovascular:      Rate and Rhythm: Normal rate and regular rhythm.   Pulmonary:      Effort: Pulmonary effort is normal.      Breath sounds: Normal breath sounds.   Abdominal:      General: Abdomen is  flat. Bowel sounds are normal.      Palpations: Abdomen is soft.   Skin:     General: Skin is warm and dry.   Neurological:      General: No focal deficit present.      Mental Status: He is alert and oriented to person, place, and time.   Psychiatric:         Mood and Affect: Mood normal.         Behavior: Behavior normal.         ED Course     Medications - No data to display  Labs Ordered and Resulted from Time of ED Arrival to Time of ED Departure - No data to display    Procedures                No results found for any visits on 07/15/22.  Medications - No data to display     Assessments & Plan (with Medical Decision Making)   Mr. Vale is a 43 yo male who presented today with b/l eye redness after falling asleep in his contacts 3 days. Redness did not resolve with over the counter lubricating eyedrops. On presentation, patient was hemodynamically stable. There was bilateral erythema but otherwise, patient visual acuity was not impaired, PERRL and EOMI. I did not appreciate any corneal opacities, discharge and patient did not endorse any feeling of foreign body sensation. This suggest bacterial keratitis is less concerning at this time. Due to Mr. Vale's acute presentation and lack of improvement with otc lubricating eyedrops we are concerned for contact lens conjunctivitis and possible bacterial conjunctivitis. We  discharged him with Ciloxin antibiotic eyedrops and recommended following up in the eye clinic in 1 week.     I have reviewed the nursing notes. I have reviewed the findings, diagnosis, plan and need for follow up with the patient.    Discharge Medication List as of 7/15/2022  1:23 PM      START taking these medications    Details   ciprofloxacin (CILOXAN) 0.3 % ophthalmic solution Place 1-2 drops into both eyes every 4 hours for 7 days, Disp-4.2 mL, R-0, Local Print             Final diagnoses:   Contact lens related conjunctivitis       Shania Resendiz MD, Resident  Dru Rooney MD,  Attending Provider   [facility] EMERGENCY DEPARTMENT  7/15/2022    --    ED Attending Physician Attestation    I Dru Rooney MD, cared for this patient with the Resident. I have performed a history and physical examination of the patient and discussed management with the resident. I reviewed the resident's documentation above and agree with the documented findings and plan of care.    Summary of HPI, PE, ED Course   Patient is a 42 year old male evaluated in the emergency department for conjunctivitis. Exam notable for bilateral conj injection. ED course notable for prescription and Eye clinic follow up. After the completion of care in the emergency department, the patient was discharged.    Critical Care & Procedures  Not applicable.    Medical Decision Making  The medical record was reviewed and interpreted.  Current labs reviewed and interpreted.  Previous labs reviewed and interpreted.      Dru Rooney MD  Emergency Medicine        Dru Rooney MD  07/16/22 5626

## 2022-07-15 NOTE — DISCHARGE INSTRUCTIONS
-Take Ciloxan eye drops as prescribed.  -Follow up with eye clinic in 1 week. (Number below)  -Recommend avoiding sleeping with contacts.   -Follow up in Emergency Department with new or worsening symptoms including fever, changes in vision, feeling of a foreign object sensation in eye(s), inability to open eyes, worsening redness or discharge.  Please make an appointment to follow up with Eye Clinic (phone: [phone]) in 7 days.

## 2022-07-15 NOTE — ED TRIAGE NOTES
Triage Assessment     Row Name 07/15/22 1202       Triage Assessment (Adult)    Airway WDL WDL       Respiratory WDL    Respiratory WDL WDL       Skin Circulation/Temperature WDL    Skin Circulation/Temperature WDL WDL       Cardiac WDL    Cardiac WDL WDL       Peripheral/Neurovascular WDL    Peripheral Neurovascular WDL WDL       Cognitive/Neuro/Behavioral WDL    Cognitive/Neuro/Behavioral WDL WDL

## 2022-08-05 ENCOUNTER — TELEPHONE (OUTPATIENT)
Dept: OPTOMETRY | Facility: CLINIC | Age: 42
End: 2022-08-05

## 2022-08-05 NOTE — TELEPHONE ENCOUNTER
Patient called stating that he needs ciprofloxacin (CILOXAN) 0.3 % ophthalmic solution to help with his eye again since his left is flaring up again. He would like to speak to the care team on this to see if he can get a prescription for this. He is booked to see K[facility] on 8/29/2022 to see her for this reason and is wondering if he can get this to use up until then. He would like a call back as soon as possible to discuss at [phone].    Thank You,    Sidney Fall  Patient Rep

## 2022-08-09 ENCOUNTER — TELEPHONE (OUTPATIENT)
Dept: PEDIATRICS | Facility: CLINIC | Age: 42
End: 2022-08-09

## 2022-08-09 NOTE — TELEPHONE ENCOUNTER
I have been out of the office for 4 days so did not respond. I called and Lm on vm of patient that he would need to come in for an appointment as he has never been seen in [facility] system. It looks like contact lens  wearer and a history of trauma. He would be best to seek care at his current eye provider otherwise could call and try and get him in sooner.    Babs Vale OD

## 2022-08-09 NOTE — TELEPHONE ENCOUNTER
Reason for Call:  Medication or medication refill:    Do you use a [facility]?  Name of the pharmacy and phone number for the current request: [facility]    Name of the medication requested: Eye drops     Other request: was told he would have a refill after his ER apt July 15th. Please call him to discuss.     Can we leave a detailed message on this number? YES    Phone number patient can be reached at: Home number on file [phone] (home)    Best Time: any    Call taken on 8/9/2022 at 1:26 PM by Jocelyn Morgan

## 2022-08-10 NOTE — TELEPHONE ENCOUNTER
Received call from pt requesting more eye drops for a red eye  Was seen in ER for this on 7/15/22    Eye is red again  Advised to pt that he would need to be seen before ordering any more eye drops.  Pt has an eye appt on 8/29/22    [facility] is an option for the pt  He is not established at     Pt verbalized understanding and agrees to the plan    Thank you  Gerber Torres RN on 8/10/2022 at 1:27 PM

## 2023-05-23 NOTE — DISCHARGE INSTRUCTIONS
Loss of Contact letter sent to patient via portal with [facility]. Reminder set for 30 days to review chart for follow up or request for discharge.    Last Clinical Contact Date: 1/6/2023    Please return stool specimen to the lab as soon as possible so it can be tested for H. Pylori.    A referral was placed into the computer system for you to be seen by gastroenterology for probable endoscopy. They should call you in the next 2 days to set up an appointment in the near future to be seen. If they don't call you please call them at [facility] (phone: [phone]) to set up your appointment.    A referral has also been placed in the computer for you to be seen and establish primary care to help coordinate your medical needs. They will call you to help set up a primary care clinic appointment.